# Patient Record
Sex: FEMALE | Race: WHITE | HISPANIC OR LATINO
[De-identification: names, ages, dates, MRNs, and addresses within clinical notes are randomized per-mention and may not be internally consistent; named-entity substitution may affect disease eponyms.]

---
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
## 2019-01-02 ENCOUNTER — EMERGENCY (EMERGENCY)
Facility: HOSPITAL | Age: 19
LOS: 1 days | Discharge: HOME | End: 2019-01-02
Attending: STUDENT IN AN ORGANIZED HEALTH CARE EDUCATION/TRAINING PROGRAM
Payer: MEDICAID

## 2019-01-02 VITALS
TEMPERATURE: 98 F | HEART RATE: 68 BPM | RESPIRATION RATE: 18 BRPM | WEIGHT: 158.95 LBS | DIASTOLIC BLOOD PRESSURE: 84 MMHG | OXYGEN SATURATION: 98 % | HEIGHT: 60 IN | SYSTOLIC BLOOD PRESSURE: 125 MMHG

## 2019-01-02 LAB
APPEARANCE UR: CLEAR — SIGNIFICANT CHANGE UP
BILIRUB UR-MCNC: NEGATIVE — SIGNIFICANT CHANGE UP
COLOR SPEC: YELLOW — SIGNIFICANT CHANGE UP
DIFF PNL FLD: NEGATIVE — SIGNIFICANT CHANGE UP
EPI CELLS # UR: SIGNIFICANT CHANGE UP
GLUCOSE UR QL: 1000 MG/DL
KETONES UR-MCNC: ABNORMAL
LEUKOCYTE ESTERASE UR-ACNC: ABNORMAL
NITRITE UR-MCNC: NEGATIVE — SIGNIFICANT CHANGE UP
PH UR: 6 — SIGNIFICANT CHANGE UP (ref 5–8)
PROT UR-MCNC: 30 MG/DL
RBC CASTS # UR COMP ASSIST: ABNORMAL /HPF (ref 0–4)
SP GR SPEC: 1.02 — SIGNIFICANT CHANGE UP (ref 1.01–1.02)
UROBILINOGEN FLD QL: NEGATIVE MG/DL — SIGNIFICANT CHANGE UP
WBC UR QL: SIGNIFICANT CHANGE UP

## 2019-01-02 PROCEDURE — 81001 URINALYSIS AUTO W/SCOPE: CPT

## 2019-01-02 PROCEDURE — 76817 TRANSVAGINAL US OBSTETRIC: CPT

## 2019-01-02 PROCEDURE — 76817 TRANSVAGINAL US OBSTETRIC: CPT | Mod: 26

## 2019-01-02 PROCEDURE — 87086 URINE CULTURE/COLONY COUNT: CPT

## 2019-01-02 PROCEDURE — 99282 EMERGENCY DEPT VISIT SF MDM: CPT | Mod: 25

## 2019-01-02 PROCEDURE — 76705 ECHO EXAM OF ABDOMEN: CPT | Mod: 26

## 2019-01-02 PROCEDURE — 76882 US LMTD JT/FCL EVL NVASC XTR: CPT

## 2019-01-02 PROCEDURE — 76805 OB US >/= 14 WKS SNGL FETUS: CPT | Mod: 26

## 2019-01-02 PROCEDURE — 99282 EMERGENCY DEPT VISIT SF MDM: CPT

## 2019-01-02 PROCEDURE — 76805 OB US >/= 14 WKS SNGL FETUS: CPT

## 2019-01-02 RX ORDER — ACETAMINOPHEN 500 MG
650 TABLET ORAL ONCE
Qty: 0 | Refills: 0 | Status: COMPLETED | OUTPATIENT
Start: 2019-01-02 | End: 2019-01-02

## 2019-01-02 RX ADMIN — Medication 650 MILLIGRAM(S): at 04:06

## 2019-01-02 NOTE — ED PROVIDER NOTE - MUSCULOSKELETAL, MLM
Spine appears normal, range of motion is not limited, no muscle or joint tenderness + diffuse area of swelling on low back - no erythema or fluctuance

## 2019-01-02 NOTE — ED PROVIDER NOTE - OBJECTIVE STATEMENT
19 y/o F c/o middle low back pain x 1 day.  Patient is 18 weeks pregnant - also c/o vaginal spotting.  She states that she's used 1 pad today.  Patient sees Dr. Martins.  Patient is tolerating PO.  Denies nausea or vomiting at this time.  Denies fever or dysuria.  Patient also c/o area of swelling on low back.  Denies IV drug use.

## 2019-01-02 NOTE — ED PROVIDER NOTE - ATTENDING CONTRIBUTION TO CARE
17 yo female with acute lower back pain with localized tenderness to L5/S1 region. I personally saw the patient with the PA, and completed the key components of the history and physical exam. I then discussed the management plan with the PA.

## 2019-01-03 LAB
CULTURE RESULTS: SIGNIFICANT CHANGE UP
SPECIMEN SOURCE: SIGNIFICANT CHANGE UP

## 2019-02-03 ENCOUNTER — EMERGENCY (EMERGENCY)
Facility: HOSPITAL | Age: 19
LOS: 1 days | Discharge: HOME | End: 2019-02-03
Attending: EMERGENCY MEDICINE
Payer: MEDICAID

## 2019-02-03 VITALS
SYSTOLIC BLOOD PRESSURE: 139 MMHG | RESPIRATION RATE: 18 BRPM | DIASTOLIC BLOOD PRESSURE: 90 MMHG | TEMPERATURE: 98 F | WEIGHT: 108.03 LBS | HEIGHT: 60 IN | HEART RATE: 98 BPM | OXYGEN SATURATION: 97 %

## 2019-02-03 LAB
APPEARANCE UR: ABNORMAL
BACTERIA # UR AUTO: ABNORMAL
BILIRUB UR-MCNC: NEGATIVE — SIGNIFICANT CHANGE UP
COLOR SPEC: SIGNIFICANT CHANGE UP
DIFF PNL FLD: ABNORMAL
EPI CELLS # UR: SIGNIFICANT CHANGE UP
GLUCOSE UR QL: 1000 MG/DL
KETONES UR-MCNC: ABNORMAL
LEUKOCYTE ESTERASE UR-ACNC: ABNORMAL
NITRITE UR-MCNC: NEGATIVE — SIGNIFICANT CHANGE UP
PH UR: 6.5 — SIGNIFICANT CHANGE UP (ref 5–8)
PROT UR-MCNC: NEGATIVE MG/DL — SIGNIFICANT CHANGE UP
RBC CASTS # UR COMP ASSIST: ABNORMAL /HPF (ref 0–4)
SP GR SPEC: 1 — LOW (ref 1.01–1.02)
UROBILINOGEN FLD QL: NEGATIVE MG/DL — SIGNIFICANT CHANGE UP
WBC UR QL: ABNORMAL

## 2019-02-03 PROCEDURE — 99284 EMERGENCY DEPT VISIT MOD MDM: CPT | Mod: 25

## 2019-02-03 RX ORDER — ACETAMINOPHEN 500 MG
650 TABLET ORAL ONCE
Qty: 0 | Refills: 0 | Status: COMPLETED | OUTPATIENT
Start: 2019-02-03 | End: 2019-02-03

## 2019-02-03 RX ADMIN — Medication 650 MILLIGRAM(S): at 22:58

## 2019-02-03 RX ADMIN — Medication 650 MILLIGRAM(S): at 23:00

## 2019-02-03 NOTE — ED PROVIDER NOTE - PROGRESS NOTE DETAILS
reviewing previous charts I see pt has h.o diabtes original dextrose ordered secondary to dehydration and when origianly I questioned pt about her medical hisotry she stated she had none so I was unaware of her diabetic history until she was questioned again. will give normal saline insulin and reassess

## 2019-02-03 NOTE — ED PROVIDER NOTE - OBJECTIVE STATEMENT
17 y/o F pt with hx of asthma, DM, 19 weeks pregnant presents to ED c/o acute onset of RLQ abd pian radiating to her right flank x5 hours, described as constant and worsening over the last 2 hours, no recent trauma reported. Pt states she has mild vaginal spotting today denies heavy flow. Pt has not had sono of pregnancy recently. No pain meds today. denies fever. denies HA or neck pain. no chest pain or sob. no n/v/d. no urinary f/u/d. no motor or sensory deficits. denies illicit drug use. no recent travel. no rash. no other acute issues symptoms or concerns

## 2019-02-03 NOTE — ED PROVIDER NOTE - MEDICAL DECISION MAKING DETAILS
uti in pregnancy with no overt signs pyelo. she took her lantus tonight she is tolerating po fluids long talk with pt about low threshold for return such as fever increased pain or unable tolerate po fluids she understands she needs follow up tomm by ob gyn without fail . she looks better clincally and I stressed multiple time imprtance close f.u take abx and to return if any issues with her h/o of prior dka and diabtetes she has her insulin at home

## 2019-02-04 VITALS
HEART RATE: 89 BPM | OXYGEN SATURATION: 99 % | SYSTOLIC BLOOD PRESSURE: 122 MMHG | RESPIRATION RATE: 20 BRPM | TEMPERATURE: 99 F | DIASTOLIC BLOOD PRESSURE: 70 MMHG

## 2019-02-04 LAB
ALBUMIN SERPL ELPH-MCNC: 3.7 G/DL — SIGNIFICANT CHANGE UP (ref 3.3–5.2)
ALP SERPL-CCNC: 88 U/L — SIGNIFICANT CHANGE UP (ref 40–120)
ALT FLD-CCNC: 12 U/L — SIGNIFICANT CHANGE UP
ANION GAP SERPL CALC-SCNC: 17 MMOL/L — SIGNIFICANT CHANGE UP (ref 5–17)
AST SERPL-CCNC: 22 U/L — SIGNIFICANT CHANGE UP
BASOPHILS # BLD AUTO: 0 K/UL — SIGNIFICANT CHANGE UP (ref 0–0.2)
BASOPHILS NFR BLD AUTO: 0.1 % — SIGNIFICANT CHANGE UP (ref 0–2)
BILIRUB SERPL-MCNC: 0.5 MG/DL — SIGNIFICANT CHANGE UP (ref 0.4–2)
BUN SERPL-MCNC: 13 MG/DL — SIGNIFICANT CHANGE UP (ref 8–20)
CALCIUM SERPL-MCNC: 9.6 MG/DL — SIGNIFICANT CHANGE UP (ref 8.6–10.2)
CHLORIDE SERPL-SCNC: 96 MMOL/L — LOW (ref 98–107)
CO2 SERPL-SCNC: 19 MMOL/L — LOW (ref 22–29)
CREAT SERPL-MCNC: 0.59 MG/DL — SIGNIFICANT CHANGE UP (ref 0.5–1.3)
EOSINOPHIL # BLD AUTO: 0 K/UL — SIGNIFICANT CHANGE UP (ref 0–0.5)
EOSINOPHIL NFR BLD AUTO: 0.3 % — SIGNIFICANT CHANGE UP (ref 0–6)
GLUCOSE SERPL-MCNC: 469 MG/DL — HIGH (ref 70–115)
HCT VFR BLD CALC: 37 % — SIGNIFICANT CHANGE UP (ref 37–47)
HGB BLD-MCNC: 13 G/DL — SIGNIFICANT CHANGE UP (ref 12–16)
LYMPHOCYTES # BLD AUTO: 1.2 K/UL — SIGNIFICANT CHANGE UP (ref 1–4.8)
LYMPHOCYTES # BLD AUTO: 11.1 % — LOW (ref 20–55)
MAGNESIUM SERPL-MCNC: 1.9 MG/DL — SIGNIFICANT CHANGE UP (ref 1.6–2.6)
MCHC RBC-ENTMCNC: 32.3 PG — HIGH (ref 27–31)
MCHC RBC-ENTMCNC: 35.1 G/DL — SIGNIFICANT CHANGE UP (ref 32–36)
MCV RBC AUTO: 91.8 FL — SIGNIFICANT CHANGE UP (ref 81–99)
MONOCYTES # BLD AUTO: 0.9 K/UL — HIGH (ref 0–0.8)
MONOCYTES NFR BLD AUTO: 7.9 % — SIGNIFICANT CHANGE UP (ref 3–10)
NEUTROPHILS # BLD AUTO: 8.7 K/UL — HIGH (ref 1.8–8)
NEUTROPHILS NFR BLD AUTO: 80.2 % — HIGH (ref 37–73)
PLATELET # BLD AUTO: 308 K/UL — SIGNIFICANT CHANGE UP (ref 150–400)
POTASSIUM SERPL-MCNC: 4.5 MMOL/L — SIGNIFICANT CHANGE UP (ref 3.5–5.3)
POTASSIUM SERPL-SCNC: 4.5 MMOL/L — SIGNIFICANT CHANGE UP (ref 3.5–5.3)
PROT SERPL-MCNC: 7.6 G/DL — SIGNIFICANT CHANGE UP (ref 6.6–8.7)
RBC # BLD: 4.03 M/UL — LOW (ref 4.4–5.2)
RBC # FLD: 11.9 % — SIGNIFICANT CHANGE UP (ref 11–15.6)
SODIUM SERPL-SCNC: 132 MMOL/L — LOW (ref 135–145)
WBC # BLD: 10.9 K/UL — HIGH (ref 4.8–10.8)
WBC # FLD AUTO: 10.9 K/UL — HIGH (ref 4.8–10.8)

## 2019-02-04 PROCEDURE — 87086 URINE CULTURE/COLONY COUNT: CPT

## 2019-02-04 PROCEDURE — 96372 THER/PROPH/DIAG INJ SC/IM: CPT

## 2019-02-04 PROCEDURE — 83735 ASSAY OF MAGNESIUM: CPT

## 2019-02-04 PROCEDURE — 99284 EMERGENCY DEPT VISIT MOD MDM: CPT | Mod: 25

## 2019-02-04 PROCEDURE — 82962 GLUCOSE BLOOD TEST: CPT

## 2019-02-04 PROCEDURE — 80053 COMPREHEN METABOLIC PANEL: CPT

## 2019-02-04 PROCEDURE — 87186 SC STD MICRODIL/AGAR DIL: CPT

## 2019-02-04 PROCEDURE — 36415 COLL VENOUS BLD VENIPUNCTURE: CPT

## 2019-02-04 PROCEDURE — 85027 COMPLETE CBC AUTOMATED: CPT

## 2019-02-04 PROCEDURE — 76775 US EXAM ABDO BACK WALL LIM: CPT

## 2019-02-04 PROCEDURE — 81001 URINALYSIS AUTO W/SCOPE: CPT

## 2019-02-04 PROCEDURE — 76775 US EXAM ABDO BACK WALL LIM: CPT | Mod: 26

## 2019-02-04 RX ORDER — SODIUM CHLORIDE 9 MG/ML
1000 INJECTION, SOLUTION INTRAVENOUS
Qty: 0 | Refills: 0 | Status: DISCONTINUED | OUTPATIENT
Start: 2019-02-04 | End: 2019-02-04

## 2019-02-04 RX ORDER — CEPHALEXIN 500 MG
500 CAPSULE ORAL ONCE
Qty: 0 | Refills: 0 | Status: COMPLETED | OUTPATIENT
Start: 2019-02-04 | End: 2019-02-04

## 2019-02-04 RX ORDER — SODIUM CHLORIDE 9 MG/ML
1000 INJECTION, SOLUTION INTRAVENOUS
Qty: 0 | Refills: 0 | Status: COMPLETED | OUTPATIENT
Start: 2019-02-04 | End: 2019-02-04

## 2019-02-04 RX ORDER — SODIUM CHLORIDE 9 MG/ML
1000 INJECTION INTRAMUSCULAR; INTRAVENOUS; SUBCUTANEOUS ONCE
Qty: 0 | Refills: 0 | Status: COMPLETED | OUTPATIENT
Start: 2019-02-04 | End: 2019-02-04

## 2019-02-04 RX ORDER — CEPHALEXIN 500 MG
1 CAPSULE ORAL
Qty: 28 | Refills: 0 | OUTPATIENT
Start: 2019-02-04 | End: 2019-02-10

## 2019-02-04 RX ORDER — INSULIN HUMAN 100 [IU]/ML
8 INJECTION, SOLUTION SUBCUTANEOUS ONCE
Qty: 0 | Refills: 0 | Status: COMPLETED | OUTPATIENT
Start: 2019-02-04 | End: 2019-02-04

## 2019-02-04 RX ADMIN — INSULIN HUMAN 8 UNIT(S): 100 INJECTION, SOLUTION SUBCUTANEOUS at 02:29

## 2019-02-04 RX ADMIN — SODIUM CHLORIDE 1000 MILLILITER(S): 9 INJECTION, SOLUTION INTRAVENOUS at 01:29

## 2019-02-04 RX ADMIN — SODIUM CHLORIDE 2000 MILLILITER(S): 9 INJECTION INTRAMUSCULAR; INTRAVENOUS; SUBCUTANEOUS at 02:29

## 2019-02-04 RX ADMIN — Medication 500 MILLIGRAM(S): at 04:41

## 2019-02-04 NOTE — ED ADULT NURSE REASSESSMENT NOTE - NS ED NURSE REASSESS COMMENT FT1
pt told Rn and MD that she has no medical history. MD ordered fluids with d5NS due to no medical history. when asked again if patient had medical history due to elevated glucose level pt states she has diabetes. fluids chanegd to NS

## 2019-03-07 NOTE — PATIENT PROFILE ADULT. - FUNCTIONAL LEVEL PRIOR: DRESSING
NURSING ADMISSION NOTE      Patient admitted via PACU  Oriented to room. Safety precautions initiated. Bed in low position. Call light in reach. (0) independent

## 2019-03-17 ENCOUNTER — INPATIENT (INPATIENT)
Facility: HOSPITAL | Age: 19
LOS: 2 days | Discharge: STILL ADMITTED OR ADMINISTRATIVE | DRG: 831 | End: 2019-03-20
Attending: OBSTETRICS & GYNECOLOGY | Admitting: INTERNAL MEDICINE
Payer: MEDICAID

## 2019-03-17 VITALS
SYSTOLIC BLOOD PRESSURE: 150 MMHG | RESPIRATION RATE: 35 BRPM | WEIGHT: 100.09 LBS | DIASTOLIC BLOOD PRESSURE: 94 MMHG | OXYGEN SATURATION: 98 % | HEART RATE: 118 BPM | HEIGHT: 63 IN

## 2019-03-17 DIAGNOSIS — E13.10 OTHER SPECIFIED DIABETES MELLITUS WITH KETOACIDOSIS WITHOUT COMA: ICD-10-CM

## 2019-03-17 DIAGNOSIS — R69 ILLNESS, UNSPECIFIED: ICD-10-CM

## 2019-03-17 DIAGNOSIS — E10.10 TYPE 1 DIABETES MELLITUS WITH KETOACIDOSIS WITHOUT COMA: ICD-10-CM

## 2019-03-17 LAB
ALBUMIN SERPL ELPH-MCNC: 4.4 G/DL — SIGNIFICANT CHANGE UP (ref 3.3–5.2)
ALP SERPL-CCNC: 131 U/L — HIGH (ref 40–120)
ALT FLD-CCNC: 16 U/L — SIGNIFICANT CHANGE UP
ANION GAP SERPL CALC-SCNC: 16 MMOL/L — SIGNIFICANT CHANGE UP (ref 5–17)
ANION GAP SERPL CALC-SCNC: 18 MMOL/L — HIGH (ref 5–17)
ANION GAP SERPL CALC-SCNC: >28 MMOL/L — HIGH (ref 5–17)
APPEARANCE UR: CLEAR — SIGNIFICANT CHANGE UP
AST SERPL-CCNC: 17 U/L — SIGNIFICANT CHANGE UP
BACTERIA # UR AUTO: ABNORMAL
BASOPHILS # BLD AUTO: 0 K/UL — SIGNIFICANT CHANGE UP (ref 0–0.2)
BASOPHILS NFR BLD AUTO: 1 % — SIGNIFICANT CHANGE UP (ref 0–2)
BILIRUB SERPL-MCNC: <0.2 MG/DL — LOW (ref 0.4–2)
BILIRUB UR-MCNC: NEGATIVE — SIGNIFICANT CHANGE UP
BUN SERPL-MCNC: 15 MG/DL — SIGNIFICANT CHANGE UP (ref 8–20)
BUN SERPL-MCNC: 17 MG/DL — SIGNIFICANT CHANGE UP (ref 8–20)
BUN SERPL-MCNC: 25 MG/DL — HIGH (ref 8–20)
CALCIUM SERPL-MCNC: 10 MG/DL — SIGNIFICANT CHANGE UP (ref 8.6–10.2)
CALCIUM SERPL-MCNC: 8.3 MG/DL — LOW (ref 8.6–10.2)
CALCIUM SERPL-MCNC: 8.3 MG/DL — LOW (ref 8.6–10.2)
CHLORIDE SERPL-SCNC: 111 MMOL/L — HIGH (ref 98–107)
CHLORIDE SERPL-SCNC: 111 MMOL/L — HIGH (ref 98–107)
CHLORIDE SERPL-SCNC: 92 MMOL/L — LOW (ref 98–107)
CO2 SERPL-SCNC: 10 MMOL/L — CRITICAL LOW (ref 22–29)
CO2 SERPL-SCNC: 8 MMOL/L — CRITICAL LOW (ref 22–29)
CO2 SERPL-SCNC: <6 MMOL/L — CRITICAL LOW (ref 22–29)
COLOR SPEC: YELLOW — SIGNIFICANT CHANGE UP
CREAT SERPL-MCNC: 0.65 MG/DL — SIGNIFICANT CHANGE UP (ref 0.5–1.3)
CREAT SERPL-MCNC: 0.75 MG/DL — SIGNIFICANT CHANGE UP (ref 0.5–1.3)
CREAT SERPL-MCNC: 1.13 MG/DL — SIGNIFICANT CHANGE UP (ref 0.5–1.3)
DIFF PNL FLD: ABNORMAL
EOSINOPHIL # BLD AUTO: 0 K/UL — SIGNIFICANT CHANGE UP (ref 0–0.5)
EPI CELLS # UR: SIGNIFICANT CHANGE UP
GAS PNL BLDV: SIGNIFICANT CHANGE UP
GLUCOSE BLDC GLUCOMTR-MCNC: 174 MG/DL — HIGH (ref 70–99)
GLUCOSE BLDC GLUCOMTR-MCNC: 185 MG/DL — HIGH (ref 70–99)
GLUCOSE BLDC GLUCOMTR-MCNC: 191 MG/DL — HIGH (ref 70–99)
GLUCOSE BLDC GLUCOMTR-MCNC: 212 MG/DL — HIGH (ref 70–99)
GLUCOSE SERPL-MCNC: 191 MG/DL — HIGH (ref 70–115)
GLUCOSE SERPL-MCNC: 209 MG/DL — HIGH (ref 70–115)
GLUCOSE SERPL-MCNC: 628 MG/DL — CRITICAL HIGH (ref 70–115)
GLUCOSE UR QL: 1000 MG/DL
HCT VFR BLD CALC: 40.1 % — SIGNIFICANT CHANGE UP (ref 37–47)
HGB BLD-MCNC: 13.7 G/DL — SIGNIFICANT CHANGE UP (ref 12–16)
KETONES UR-MCNC: ABNORMAL
LEUKOCYTE ESTERASE UR-ACNC: NEGATIVE — SIGNIFICANT CHANGE UP
LYMPHOCYTES # BLD AUTO: 1 K/UL — SIGNIFICANT CHANGE UP (ref 1–4.8)
LYMPHOCYTES # BLD AUTO: 7 % — LOW (ref 20–55)
MAGNESIUM SERPL-MCNC: 1.7 MG/DL — SIGNIFICANT CHANGE UP (ref 1.6–2.6)
MCHC RBC-ENTMCNC: 31.4 PG — HIGH (ref 27–31)
MCHC RBC-ENTMCNC: 34.2 G/DL — SIGNIFICANT CHANGE UP (ref 32–36)
MCV RBC AUTO: 92 FL — SIGNIFICANT CHANGE UP (ref 81–99)
MONOCYTES # BLD AUTO: 0.4 K/UL — SIGNIFICANT CHANGE UP (ref 0–0.8)
MONOCYTES NFR BLD AUTO: 3 % — SIGNIFICANT CHANGE UP (ref 3–10)
NEUTROPHILS # BLD AUTO: 13.4 K/UL — HIGH (ref 1.8–8)
NEUTROPHILS NFR BLD AUTO: 84 % — HIGH (ref 37–73)
NITRITE UR-MCNC: NEGATIVE — SIGNIFICANT CHANGE UP
PH UR: 5 — SIGNIFICANT CHANGE UP (ref 5–8)
PHOSPHATE SERPL-MCNC: 1.6 MG/DL — LOW (ref 2.4–4.7)
PLATELET # BLD AUTO: 642 K/UL — HIGH (ref 150–400)
POTASSIUM SERPL-MCNC: 3.5 MMOL/L — SIGNIFICANT CHANGE UP (ref 3.5–5.3)
POTASSIUM SERPL-MCNC: 3.6 MMOL/L — SIGNIFICANT CHANGE UP (ref 3.5–5.3)
POTASSIUM SERPL-MCNC: 6 MMOL/L — HIGH (ref 3.5–5.3)
POTASSIUM SERPL-SCNC: 3.5 MMOL/L — SIGNIFICANT CHANGE UP (ref 3.5–5.3)
POTASSIUM SERPL-SCNC: 3.6 MMOL/L — SIGNIFICANT CHANGE UP (ref 3.5–5.3)
POTASSIUM SERPL-SCNC: 6 MMOL/L — HIGH (ref 3.5–5.3)
PROT SERPL-MCNC: 9.7 G/DL — HIGH (ref 6.6–8.7)
PROT UR-MCNC: 100 MG/DL
RBC # BLD: 4.36 M/UL — LOW (ref 4.4–5.2)
RBC # FLD: 13.3 % — SIGNIFICANT CHANGE UP (ref 11–15.6)
RBC CASTS # UR COMP ASSIST: SIGNIFICANT CHANGE UP /HPF (ref 0–4)
SODIUM SERPL-SCNC: 126 MMOL/L — LOW (ref 135–145)
SODIUM SERPL-SCNC: 137 MMOL/L — SIGNIFICANT CHANGE UP (ref 135–145)
SODIUM SERPL-SCNC: 137 MMOL/L — SIGNIFICANT CHANGE UP (ref 135–145)
SP GR SPEC: 1.02 — SIGNIFICANT CHANGE UP (ref 1.01–1.02)
UROBILINOGEN FLD QL: NEGATIVE MG/DL — SIGNIFICANT CHANGE UP
WBC # BLD: 14.9 K/UL — HIGH (ref 4.8–10.8)
WBC # FLD AUTO: 14.9 K/UL — HIGH (ref 4.8–10.8)
WBC UR QL: SIGNIFICANT CHANGE UP

## 2019-03-17 PROCEDURE — 71046 X-RAY EXAM CHEST 2 VIEWS: CPT | Mod: 26

## 2019-03-17 PROCEDURE — 99291 CRITICAL CARE FIRST HOUR: CPT

## 2019-03-17 RX ORDER — POTASSIUM PHOSPHATE, MONOBASIC POTASSIUM PHOSPHATE, DIBASIC 236; 224 MG/ML; MG/ML
15 INJECTION, SOLUTION INTRAVENOUS ONCE
Qty: 0 | Refills: 0 | Status: COMPLETED | OUTPATIENT
Start: 2019-03-17 | End: 2019-03-18

## 2019-03-17 RX ORDER — SODIUM CHLORIDE 9 MG/ML
1000 INJECTION, SOLUTION INTRAVENOUS ONCE
Qty: 0 | Refills: 0 | Status: COMPLETED | OUTPATIENT
Start: 2019-03-17 | End: 2019-03-17

## 2019-03-17 RX ORDER — ONDANSETRON 8 MG/1
4 TABLET, FILM COATED ORAL ONCE
Qty: 0 | Refills: 0 | Status: DISCONTINUED | OUTPATIENT
Start: 2019-03-17 | End: 2019-03-20

## 2019-03-17 RX ORDER — INSULIN HUMAN 100 [IU]/ML
5 INJECTION, SOLUTION SUBCUTANEOUS ONCE
Qty: 0 | Refills: 0 | Status: COMPLETED | OUTPATIENT
Start: 2019-03-17 | End: 2019-03-17

## 2019-03-17 RX ORDER — INSULIN ASPART 100 [IU]/ML
6 INJECTION, SOLUTION SUBCUTANEOUS
Qty: 0 | Refills: 0 | COMMUNITY

## 2019-03-17 RX ORDER — ONDANSETRON 8 MG/1
4 TABLET, FILM COATED ORAL ONCE
Qty: 0 | Refills: 0 | Status: COMPLETED | OUTPATIENT
Start: 2019-03-17 | End: 2019-03-17

## 2019-03-17 RX ORDER — ENOXAPARIN SODIUM 100 MG/ML
40 INJECTION SUBCUTANEOUS DAILY
Qty: 0 | Refills: 0 | Status: DISCONTINUED | OUTPATIENT
Start: 2019-03-17 | End: 2019-03-20

## 2019-03-17 RX ORDER — SODIUM CHLORIDE 9 MG/ML
1000 INJECTION, SOLUTION INTRAVENOUS
Qty: 0 | Refills: 0 | Status: DISCONTINUED | OUTPATIENT
Start: 2019-03-17 | End: 2019-03-18

## 2019-03-17 RX ORDER — SODIUM CHLORIDE 9 MG/ML
2000 INJECTION, SOLUTION INTRAVENOUS ONCE
Qty: 0 | Refills: 0 | Status: COMPLETED | OUTPATIENT
Start: 2019-03-17 | End: 2019-03-17

## 2019-03-17 RX ORDER — INSULIN HUMAN 100 [IU]/ML
2 INJECTION, SOLUTION SUBCUTANEOUS
Qty: 100 | Refills: 0 | Status: DISCONTINUED | OUTPATIENT
Start: 2019-03-17 | End: 2019-03-18

## 2019-03-17 RX ORDER — SODIUM CHLORIDE 9 MG/ML
1000 INJECTION, SOLUTION INTRAVENOUS
Qty: 0 | Refills: 0 | Status: DISCONTINUED | OUTPATIENT
Start: 2019-03-17 | End: 2019-03-17

## 2019-03-17 RX ORDER — INSULIN LISPRO 100/ML
4 VIAL (ML) SUBCUTANEOUS ONCE
Qty: 0 | Refills: 0 | Status: DISCONTINUED | OUTPATIENT
Start: 2019-03-17 | End: 2019-03-17

## 2019-03-17 RX ADMIN — INSULIN HUMAN 5 UNIT(S)/HR: 100 INJECTION, SOLUTION SUBCUTANEOUS at 17:41

## 2019-03-17 RX ADMIN — ONDANSETRON 4 MILLIGRAM(S): 8 TABLET, FILM COATED ORAL at 17:40

## 2019-03-17 RX ADMIN — SODIUM CHLORIDE 200 MILLILITER(S): 9 INJECTION, SOLUTION INTRAVENOUS at 19:41

## 2019-03-17 RX ADMIN — SODIUM CHLORIDE 2000 MILLILITER(S): 9 INJECTION, SOLUTION INTRAVENOUS at 19:23

## 2019-03-17 RX ADMIN — SODIUM CHLORIDE 1000 MILLILITER(S): 9 INJECTION, SOLUTION INTRAVENOUS at 19:23

## 2019-03-17 RX ADMIN — SODIUM CHLORIDE 200 MILLILITER(S): 9 INJECTION, SOLUTION INTRAVENOUS at 20:50

## 2019-03-17 RX ADMIN — SODIUM CHLORIDE 1000 MILLILITER(S): 9 INJECTION, SOLUTION INTRAVENOUS at 17:29

## 2019-03-17 RX ADMIN — INSULIN HUMAN 5 UNIT(S): 100 INJECTION, SOLUTION SUBCUTANEOUS at 17:32

## 2019-03-17 RX ADMIN — SODIUM CHLORIDE 2000 MILLILITER(S): 9 INJECTION, SOLUTION INTRAVENOUS at 17:29

## 2019-03-17 NOTE — H&P ADULT - NSICDXPROBLEM_GEN_ALL_CORE_FT
PROBLEM DIAGNOSES  Problem: Type 1 diabetes mellitus with ketoacidosis  Assessment and Plan: as above    Problem: Acute illness during third pregnancy  Assessment and Plan:       R/O PROBLEM DIAGNOSES  Problem: Acute UTI  Assessment and Plan: as above

## 2019-03-17 NOTE — ED ADULT NURSE NOTE - OBJECTIVE STATEMENT
Pt presented to ED with c/o labored breathing, hyperglycemia, and N/V. Pt states she is 27 weeks pregnant. Pt has hx of DM type 1. Pt states she began feeling worse earlier today.

## 2019-03-17 NOTE — H&P ADULT - ASSESSMENT
19 y/o F, PMHx of DM1, DKA, Asthma,  26 weeks GA p/w SOB BIBEMS w/ FS in 450s. Vomited once in ED. Endorses Right flank pain. Recent h/o influenza and Cystitis 2 weeks ago. FS 500s, AG 28. Started on insulin gtt U/hr, given 5 U IV push Humalin, 3L LR bolus.     # DKA  Titrate Insulin ggt  POCT Glucose checks q1h  Doxalamine, Pyridoxine prn for N/V  NPO  Blood, sputum, urine Cx    # Pregnancy, 27 weeks GA  MFM consulted for fetal wellbeing      # ?UTI  UA neg   UCx pending      35 minutes assessing presenting problems of acute illness, which pose high probability of life threatening deterioration or end organ damage/dysfunction, as well as medical decision making including initiating plan of care, reviewing data, reviewing radiologic exams, discussing with multidisciplinary team,  discussing goals of care with patient/family, and writing this note.  Non-inclusive of procedures performed, 19 y/o F, PMHx of DM1, DKA, Asthma,  26 weeks GA p/w SOB BIBEMS w/ FS in 450s. Vomited once in ED. Endorses Right flank pain. Recent h/o influenza and Cystitis 2 weeks ago. FS 500s, AG 28. Started on insulin gtt U/hr, given 5 U IV push Humalin, 3L LR bolus.   Will admit to MICU for DKA and the need for insulin gtt and aggressive fluid resuscitation. Will get OB/GYN on the case.    Plan discussed with MICU attending Dr. Timmons    Critical care time: 48 minutes assessing presenting problems of acute illness, which pose high probability of life threatening deterioration or end organ damage/dysfunction, as well as medical decision making including initiating plan of care, reviewing data, reviewing radiologic exams, discussing with multidisciplinary team,  discussing goals of care with patient/family, and writing this note.  Non-inclusive of procedures performed,        Problem list:  1) DKA  2) Respiratory distress  3) pregnancy  4) SIRS    # DKA  Start insulin infusion and q1 hour accuchecks  Aggressive fluid resuscitation, s/p 3L in ER, start fluids at 200cc/hr   BMP q4 hours  Replace potassium, mag, phos as needed   NPO  Blood, sputum, urine Cx  Will need endocrine and diabetes specialist consult     # Pregnancy, 27 weeks GA  OB/GYN  consulted for fetal wellbeing    # SIRS  Need to r/o infection given SIRS and recent cystitis  UA negative, f/u culture  obtain blood cultures, CXR

## 2019-03-17 NOTE — ED PROVIDER NOTE - PHYSICAL EXAMINATION
Julianna Martínez DO.:   GENERAL: Patient awake alert, tachypenic.  HEENT: NC/AT, Moist mucous membranes, PERRL, EOMI.  LUNGS: CTAB, no wheezes or crackles. Tachypneic.  CARDIAC: RRR, no m/r/g.    ABDOMEN: Gravid, Soft, NT, ND, No rebound, guarding. No CVA tenderness.   EXT: No edema. No calf tenderness.  MSK: No spinal tenderness, no pain with movement, no deformities.  NEURO: A&Ox3. Moving all extremities.  SKIN: Warm and dry. No rash.  PSYCH: Normal affect.

## 2019-03-17 NOTE — PHARMACOTHERAPY INTERVENTION NOTE - COMMENTS
Medication reconciliation completed with patient at bedside. She is on NPH 20 units in the morning and 8 units in the evening, as well as Humalog before meals based on carb counting.     She was previously on nitrofurantoin, cefdinir and azithromycin for "kidney infection".

## 2019-03-17 NOTE — ED PROVIDER NOTE - OBJECTIVE STATEMENT
18F pmhx of DM1 (with DKA),  26 weeks pregnant presents with SOB and hyperglycemia, by EMS FS was 450s, here in ER "high." Patient states that had taken her home insulin (doesn't remember how much) prior to calling EMS. No issues with pregnancy 18F pmhx of DM1 (with DKA),  26 weeks pregnant presents with SOB and hyperglycemia, by EMS FS was 450s, here in ER "high." Patient states that had taken her home insulin (doesn't remember how much) prior to calling EMS. No issues with pregnancy. No other complaints. Has had DKA in the past. No cough, recent illness, fevers, chills, N/V/D.

## 2019-03-17 NOTE — CONSULT NOTE ADULT - NSICDXPROBLEM_GEN_ALL_CORE_FT
PROBLEM DIAGNOSES  Problem: Acute illness during third pregnancy  Recommendation: As per primary team    Problem: Type 1 diabetes mellitus with ketoacidosis  Recommendation: As per primary team    Problem: DKA (diabetic ketoacidoses)  Recommendation: As per primary team

## 2019-03-17 NOTE — ED PROVIDER NOTE - NS ED ROS FT
CONST: no fevers, no chills  EYES: no pain, no vision changes  ENT: no sore throat, no ear pain, no change in hearing  CV: no chest pain, no leg swelling  RESP: +shortness of breath, no cough  ABD: no abdominal pain, no nausea, no vomiting, no diarrhea  : no dysuria, no flank pain, no hematuria  MSK: no back pain, no extremity pain  NEURO: no headache or additional neurologic complaints  HEME: no easy bleeding  SKIN:  no rash

## 2019-03-17 NOTE — ED PROVIDER NOTE - ATTENDING CONTRIBUTION TO CARE
I, Jayson Arango, personally saw the patient with the resident, and completed the key components of the history and physical exam. I then discussed the management plan with the resident.    17 yo F hx of DM, multiple DKA episodes, , 27 weeks pregnant, p/w hyperglycemia and sob. On exam, patient looked uncomfortable, RRR, kusmal breathing, gravid abdomen that is non-tender, no leg swelling. She was found to be in DKA with glucose of 628, bicarb <6, anion gap >28, large ketone in urine. Patient received multiple boluses of IVF, insulin bolus and gtt, admitted to MICU for DKA.

## 2019-03-17 NOTE — ED PROVIDER NOTE - CLINICAL SUMMARY MEDICAL DECISION MAKING FREE TEXT BOX
18F 26 weeks pregnant presents with hyperglycemia. Concern for DKA vs. HHNC. Will get labs, blood gas. Start insulin. ICU consult, admit.

## 2019-03-17 NOTE — H&P ADULT - NSHPLABSRESULTS_GEN_ALL_CORE
Lab Results:  CBC  CBC Full  -  ( 17 Mar 2019 16:05 )  WBC Count : 14.9 K/uL  Hemoglobin : 13.7 g/dL  Hematocrit : 40.1 %  Platelet Count - Automated : 642 K/uL  Mean Cell Volume : 92.0 fl  Mean Cell Hemoglobin : 31.4 pg  Mean Cell Hemoglobin Concentration : 34.2 g/dL  Auto Neutrophil # : 13.4 K/uL  Auto Lymphocyte # : 1.0 K/uL  Auto Monocyte # : 0.4 K/uL  Auto Eosinophil # : 0.0 K/uL  Auto Basophil # : 0.0 K/uL  Auto Neutrophil % : 84.0 %  Auto Lymphocyte % : 7.0 %  Auto Monocyte % : 3.0 %  Auto Eosinophil % : x  Auto Basophil % : 1.0 %    .		Differential:	[] Automated		[] Manual  Chemistry      126<L>  |  92<L>  |  25.0<H>  ----------------------------<  628<HH>  6.0<H>   |  <6.0<LL>  |  1.13    Ca    10.0      17 Mar 2019 16:05    TPro  9.7<H>  /  Alb  4.4  /  TBili  <0.2<L>  /  DBili  x   /  AST  17  /  ALT  16  /  AlkPhos  131<H>  03-17    LIVER FUNCTIONS - ( 17 Mar 2019 16:05 )  Alb: 4.4 g/dL / Pro: 9.7 g/dL / ALK PHOS: 131 U/L / ALT: 16 U/L / AST: 17 U/L / GGT: x             Urinalysis Basic - ( 17 Mar 2019 17:14 )    Color: Yellow / Appearance: Clear / S.020 / pH: x  Gluc: x / Ketone: Large  / Bili: Negative / Urobili: Negative mg/dL   Blood: x / Protein: 100 mg/dL / Nitrite: Negative   Leuk Esterase: Negative / RBC: 0-2 /HPF / WBC 0-2   Sq Epi: x / Non Sq Epi: Occasional / Bacteria: Occasional        MICROBIOLOGY/CULTURES:    RADIOLOGY RESULTS:    Toxicities (with grade)  1.  2.  3.  4.

## 2019-03-17 NOTE — CONSULT NOTE ADULT - SUBJECTIVE AND OBJECTIVE BOX
Name: ARIE HAYDEN  MRN: [id number]  Allergies: apple (Unknown)  Pears (Unknown)      ARIE HAYDEN 18y  at approximately 27w2d by ADWOA 18 with known T1DM admitted for management of DKA with FS in 450s. Pt states that her insulin regimen was recently changed to Humulin 20U AM, 8U PM and Humulog PRN before/after meals.   Denies any complications with the pregnancy thus far. Her T1DM is managed by an MFM at [facility] but is unable to recall her name. She has been admitted to [facility] in the past for DKA.  Reports no problems with fetal growth, amniotic fluid, or blood pressures during her pregnancy.  Denies VB, CTX or LOF. +FM.    Her OBGYN is Dr. Glenroy Burris.    PAST OBSTETRICAL HISTORY: SAB x2    PAST MEDICAL HISTORY:  Asthma  Diabetes  Diabetes mellitus type 1    PAST SURGICAL HISTORY:  S/P tonsillectomy and adenoidectomy  S/P tonsillectomy and adenoidectomy    Home Medications:  HumaLOG 100 units/mL injectable solution: Prior to meals (carb counting) (17 Mar 2019 19:07)  insulin isophane (NPH) 100 units/mL human recombinant subcutaneous suspension: 20 units in the morning and 8 units at bedtime (17 Mar 2019 19:07)  Prenatal Multivitamins with Folic Acid 1 mg oral tablet: 1 tab(s) orally once a day (17 Mar 2019 19:07)    HOSPITAL MEDS:  enoxaparin Injectable 40 milliGRAM(s) SubCutaneous daily  insulin Infusion 5 Unit(s)/Hr IV Continuous <Continuous>  multiple electrolytes Injection Type 1 1000 milliLiter(s) IV Continuous <Continuous>    Allergies    apple (Unknown)  No Known Drug Allergies  Pears (Unknown)    Intolerances    Vital Signs Last 24 Hrs  T(C): 36.8 (17 Mar 2019 19:31), Max: 36.8 (17 Mar 2019 19:31)  T(F): 98.2 (17 Mar 2019 19:31), Max: 98.2 (17 Mar 2019 19:31)  HR: 100 (17 Mar 2019 19:31) (100 - 118)  BP: 128/84 (17 Mar 2019 19:31) (128/84 - 150/94)  BP(mean): --  RR: 24 (17 Mar 2019 19:31) (24 - 35)  SpO2: 98% (17 Mar 2019 19:31) (98% - 100%)    PHYSICAL EXAM:  GEN: NAD, AOx3  Abd: Gravid, Soft, Nontender, Nondistended. No rebound tenderness or guarding.     LABS:                        13.7   14.9  )-----------( 642      ( 17 Mar 2019 16:05 )             40.1     03-17    126<L>  |  92<L>  |  25.0<H>  ----------------------------<  628<HH>  6.0<H>   |  <6.0<LL>  |  1.13    Ca    10.0      17 Mar 2019 16:05    TPro  9.7<H>  /  Alb  4.4  /  TBili  <0.2<L>  /  DBili  x   /  AST  17  /  ALT  16  /  AlkPhos  131<H>  03-17    Urinalysis Basic - ( 17 Mar 2019 17:14 )    Color: Yellow / Appearance: Clear / S.020 / pH: x  Gluc: x / Ketone: Large  / Bili: Negative / Urobili: Negative mg/dL   Blood: x / Protein: 100 mg/dL / Nitrite: Negative   Leuk Esterase: Negative / RBC: 0-2 /HPF / WBC 0-2   Sq Epi: x / Non Sq Epi: Occasional / Bacteria: Occasional    RADIOLOGY STUDIES:  Pending

## 2019-03-17 NOTE — H&P ADULT - ATTENDING COMMENTS
Pt admitted by [facility] PA, I have seen and evaluated this patient independently and agree with the above findings: type I DM with DKA in setting of possible pyelo, concurrent pregnancy (26 weeks). Continue insulin gtts, aggressive hydration and replacement of lytes. Involve Maternal Fetal Medicine, Endocrine eval.

## 2019-03-17 NOTE — CONSULT NOTE ADULT - ASSESSMENT
19yo  at 27w2d admitted for DKA management. No OB complaints today.     DKA management as per primary team.    Recommend:  Fetal BioPhysical Profile  Non-Stress Test q24h  Maternal Fetal Medicine Consult in the AM    Will continue to follow    Pt seen and examined with Dr. Rain  Case discussed with Dr. Glenroy Burris

## 2019-03-17 NOTE — H&P ADULT - HISTORY OF PRESENT ILLNESS
17 y/o F, PMHx of DM1, DKA, Asthma,  26 weeks GA p/w SOB BIBEMS w/ FS in 450s. Patient states that had taken her home insulin prior to calling EMS. Denies any pregnancy complications, has + fetal movement. Recent insulin regimine change to Humalin 20 qd, 8 hs, Humalog sliding scale. Vomited once in ED. Endorses Right flank pain. Recent h/o influenza and Cystitis 2 weeks ago. No F/C, HA, Diarrhea, vision changes, CP, Abd pain, hematuria/dysuria. FS 500s in ED, AG 28. Started on insulin gtt U/hr, given 5 U IV push Humalin, 3L LR bolus. 17 y/o F, PMHx of DM1, DKA, Asthma,  26 weeks GA p/w SOB BIBEMS w/ FS in 450s. Patient states that had taken her home insulin prior to calling EMS. Denies any pregnancy complications, has + fetal movement. Recent insulin regimine change to Humalin 20 qd, 8 hs, Humalog sliding scale. Vomited once in ED. Endorses Right flank pain. Recent h/o influenza and Cystitis 2 weeks ago. No F/C, HA, Diarrhea, vision changes, CP, Abd pain, hematuria/dysuria. FS 500s in ED, AG 28. Started on insulin gtt U/hr, given 5 U IV push Humalin, 3L LR bolus. Patient states she missed a few doses of her insulin yesterday.

## 2019-03-18 DIAGNOSIS — Z3A.27 27 WEEKS GESTATION OF PREGNANCY: ICD-10-CM

## 2019-03-18 DIAGNOSIS — E13.10 OTHER SPECIFIED DIABETES MELLITUS WITH KETOACIDOSIS WITHOUT COMA: ICD-10-CM

## 2019-03-18 LAB
ANION GAP SERPL CALC-SCNC: 12 MMOL/L — SIGNIFICANT CHANGE UP (ref 5–17)
ANION GAP SERPL CALC-SCNC: 16 MMOL/L — SIGNIFICANT CHANGE UP (ref 5–17)
B-OH-BUTYR SERPL-SCNC: 6.8 MMOL/L — HIGH
BUN SERPL-MCNC: 12 MG/DL — SIGNIFICANT CHANGE UP (ref 8–20)
BUN SERPL-MCNC: 13 MG/DL — SIGNIFICANT CHANGE UP (ref 8–20)
CALCIUM SERPL-MCNC: 8.6 MG/DL — SIGNIFICANT CHANGE UP (ref 8.6–10.2)
CALCIUM SERPL-MCNC: 9.5 MG/DL — SIGNIFICANT CHANGE UP (ref 8.6–10.2)
CHLORIDE SERPL-SCNC: 106 MMOL/L — SIGNIFICANT CHANGE UP (ref 98–107)
CHLORIDE SERPL-SCNC: 110 MMOL/L — HIGH (ref 98–107)
CO2 SERPL-SCNC: 13 MMOL/L — LOW (ref 22–29)
CO2 SERPL-SCNC: 14 MMOL/L — LOW (ref 22–29)
CREAT SERPL-MCNC: 0.42 MG/DL — LOW (ref 0.5–1.3)
CREAT SERPL-MCNC: 0.63 MG/DL — SIGNIFICANT CHANGE UP (ref 0.5–1.3)
CULTURE RESULTS: SIGNIFICANT CHANGE UP
GLUCOSE BLDC GLUCOMTR-MCNC: 103 MG/DL — HIGH (ref 70–99)
GLUCOSE BLDC GLUCOMTR-MCNC: 106 MG/DL — HIGH (ref 70–99)
GLUCOSE BLDC GLUCOMTR-MCNC: 115 MG/DL — HIGH (ref 70–99)
GLUCOSE BLDC GLUCOMTR-MCNC: 137 MG/DL — HIGH (ref 70–99)
GLUCOSE BLDC GLUCOMTR-MCNC: 149 MG/DL — HIGH (ref 70–99)
GLUCOSE BLDC GLUCOMTR-MCNC: 150 MG/DL — HIGH (ref 70–99)
GLUCOSE BLDC GLUCOMTR-MCNC: 172 MG/DL — HIGH (ref 70–99)
GLUCOSE BLDC GLUCOMTR-MCNC: 190 MG/DL — HIGH (ref 70–99)
GLUCOSE BLDC GLUCOMTR-MCNC: 222 MG/DL — HIGH (ref 70–99)
GLUCOSE BLDC GLUCOMTR-MCNC: 262 MG/DL — HIGH (ref 70–99)
GLUCOSE BLDC GLUCOMTR-MCNC: 321 MG/DL — HIGH (ref 70–99)
GLUCOSE BLDC GLUCOMTR-MCNC: 91 MG/DL — SIGNIFICANT CHANGE UP (ref 70–99)
GLUCOSE SERPL-MCNC: 150 MG/DL — HIGH (ref 70–115)
GLUCOSE SERPL-MCNC: 171 MG/DL — HIGH (ref 70–115)
HBA1C BLD-MCNC: 10.4 % — HIGH (ref 4–5.6)
HCT VFR BLD CALC: 35.2 % — LOW (ref 37–47)
HGB BLD-MCNC: 12.5 G/DL — SIGNIFICANT CHANGE UP (ref 12–16)
MAGNESIUM SERPL-MCNC: 2.7 MG/DL — HIGH (ref 1.6–2.6)
MCHC RBC-ENTMCNC: 31.2 PG — HIGH (ref 27–31)
MCHC RBC-ENTMCNC: 35.5 G/DL — SIGNIFICANT CHANGE UP (ref 32–36)
MCV RBC AUTO: 87.8 FL — SIGNIFICANT CHANGE UP (ref 81–99)
PHOSPHATE SERPL-MCNC: 2.7 MG/DL — SIGNIFICANT CHANGE UP (ref 2.4–4.7)
PLATELET # BLD AUTO: 545 K/UL — HIGH (ref 150–400)
POTASSIUM SERPL-MCNC: 3.6 MMOL/L — SIGNIFICANT CHANGE UP (ref 3.5–5.3)
POTASSIUM SERPL-MCNC: 5.3 MMOL/L — SIGNIFICANT CHANGE UP (ref 3.5–5.3)
POTASSIUM SERPL-SCNC: 3.6 MMOL/L — SIGNIFICANT CHANGE UP (ref 3.5–5.3)
POTASSIUM SERPL-SCNC: 5.3 MMOL/L — SIGNIFICANT CHANGE UP (ref 3.5–5.3)
RBC # BLD: 4.01 M/UL — LOW (ref 4.4–5.2)
RBC # FLD: 13.1 % — SIGNIFICANT CHANGE UP (ref 11–15.6)
SODIUM SERPL-SCNC: 135 MMOL/L — SIGNIFICANT CHANGE UP (ref 135–145)
SODIUM SERPL-SCNC: 136 MMOL/L — SIGNIFICANT CHANGE UP (ref 135–145)
SPECIMEN SOURCE: SIGNIFICANT CHANGE UP
WBC # BLD: 8.9 K/UL — SIGNIFICANT CHANGE UP (ref 4.8–10.8)
WBC # FLD AUTO: 8.9 K/UL — SIGNIFICANT CHANGE UP (ref 4.8–10.8)

## 2019-03-18 PROCEDURE — 99223 1ST HOSP IP/OBS HIGH 75: CPT

## 2019-03-18 PROCEDURE — 76819 FETAL BIOPHYS PROFIL W/O NST: CPT | Mod: 26

## 2019-03-18 PROCEDURE — 76815 OB US LIMITED FETUS(S): CPT | Mod: 26

## 2019-03-18 RX ORDER — DEXTROSE 50 % IN WATER 50 %
25 SYRINGE (ML) INTRAVENOUS ONCE
Qty: 0 | Refills: 0 | Status: DISCONTINUED | OUTPATIENT
Start: 2019-03-18 | End: 2019-03-20

## 2019-03-18 RX ORDER — HUMAN INSULIN 100 [IU]/ML
8 INJECTION, SUSPENSION SUBCUTANEOUS AT BEDTIME
Qty: 0 | Refills: 0 | Status: DISCONTINUED | OUTPATIENT
Start: 2019-03-18 | End: 2019-03-18

## 2019-03-18 RX ORDER — INSULIN LISPRO 100/ML
6 VIAL (ML) SUBCUTANEOUS ONCE
Qty: 0 | Refills: 0 | Status: COMPLETED | OUTPATIENT
Start: 2019-03-18 | End: 2019-03-18

## 2019-03-18 RX ORDER — SODIUM CHLORIDE 9 MG/ML
1000 INJECTION, SOLUTION INTRAVENOUS
Qty: 0 | Refills: 0 | Status: DISCONTINUED | OUTPATIENT
Start: 2019-03-18 | End: 2019-03-20

## 2019-03-18 RX ORDER — HUMAN INSULIN 100 [IU]/ML
20 INJECTION, SUSPENSION SUBCUTANEOUS
Qty: 0 | Refills: 0 | Status: DISCONTINUED | OUTPATIENT
Start: 2019-03-18 | End: 2019-03-20

## 2019-03-18 RX ORDER — DEXTROSE 50 % IN WATER 50 %
12.5 SYRINGE (ML) INTRAVENOUS ONCE
Qty: 0 | Refills: 0 | Status: DISCONTINUED | OUTPATIENT
Start: 2019-03-18 | End: 2019-03-20

## 2019-03-18 RX ORDER — INSULIN LISPRO 100/ML
3 VIAL (ML) SUBCUTANEOUS
Qty: 0 | Refills: 0 | Status: DISCONTINUED | OUTPATIENT
Start: 2019-03-18 | End: 2019-03-18

## 2019-03-18 RX ORDER — DEXTROSE 50 % IN WATER 50 %
12.5 SYRINGE (ML) INTRAVENOUS ONCE
Qty: 0 | Refills: 0 | Status: DISCONTINUED | OUTPATIENT
Start: 2019-03-18 | End: 2019-03-18

## 2019-03-18 RX ORDER — GLUCAGON INJECTION, SOLUTION 0.5 MG/.1ML
1 INJECTION, SOLUTION SUBCUTANEOUS ONCE
Qty: 0 | Refills: 0 | Status: DISCONTINUED | OUTPATIENT
Start: 2019-03-18 | End: 2019-03-20

## 2019-03-18 RX ORDER — HUMAN INSULIN 100 [IU]/ML
10 INJECTION, SUSPENSION SUBCUTANEOUS AT BEDTIME
Qty: 0 | Refills: 0 | Status: DISCONTINUED | OUTPATIENT
Start: 2019-03-18 | End: 2019-03-20

## 2019-03-18 RX ORDER — DEXTROSE 50 % IN WATER 50 %
25 SYRINGE (ML) INTRAVENOUS ONCE
Qty: 0 | Refills: 0 | Status: DISCONTINUED | OUTPATIENT
Start: 2019-03-18 | End: 2019-03-18

## 2019-03-18 RX ORDER — POTASSIUM CHLORIDE 20 MEQ
40 PACKET (EA) ORAL ONCE
Qty: 0 | Refills: 0 | Status: COMPLETED | OUTPATIENT
Start: 2019-03-18 | End: 2019-03-18

## 2019-03-18 RX ORDER — INSULIN LISPRO 100/ML
5 VIAL (ML) SUBCUTANEOUS
Qty: 0 | Refills: 0 | Status: DISCONTINUED | OUTPATIENT
Start: 2019-03-18 | End: 2019-03-20

## 2019-03-18 RX ORDER — MAGNESIUM SULFATE 500 MG/ML
2 VIAL (ML) INJECTION ONCE
Qty: 0 | Refills: 0 | Status: COMPLETED | OUTPATIENT
Start: 2019-03-18 | End: 2019-03-18

## 2019-03-18 RX ORDER — INSULIN LISPRO 100/ML
VIAL (ML) SUBCUTANEOUS
Qty: 0 | Refills: 0 | Status: DISCONTINUED | OUTPATIENT
Start: 2019-03-18 | End: 2019-03-19

## 2019-03-18 RX ORDER — DEXTROSE 50 % IN WATER 50 %
15 SYRINGE (ML) INTRAVENOUS ONCE
Qty: 0 | Refills: 0 | Status: DISCONTINUED | OUTPATIENT
Start: 2019-03-18 | End: 2019-03-20

## 2019-03-18 RX ORDER — HUMAN INSULIN 100 [IU]/ML
20 INJECTION, SUSPENSION SUBCUTANEOUS
Qty: 0 | Refills: 0 | Status: DISCONTINUED | OUTPATIENT
Start: 2019-03-18 | End: 2019-03-18

## 2019-03-18 RX ORDER — SODIUM CHLORIDE 9 MG/ML
1000 INJECTION, SOLUTION INTRAVENOUS
Qty: 0 | Refills: 0 | Status: DISCONTINUED | OUTPATIENT
Start: 2019-03-18 | End: 2019-03-18

## 2019-03-18 RX ORDER — GLUCAGON INJECTION, SOLUTION 0.5 MG/.1ML
1 INJECTION, SOLUTION SUBCUTANEOUS ONCE
Qty: 0 | Refills: 0 | Status: DISCONTINUED | OUTPATIENT
Start: 2019-03-18 | End: 2019-03-18

## 2019-03-18 RX ORDER — ACETAMINOPHEN 500 MG
650 TABLET ORAL ONCE
Qty: 0 | Refills: 0 | Status: COMPLETED | OUTPATIENT
Start: 2019-03-18 | End: 2019-03-18

## 2019-03-18 RX ORDER — INSULIN HUMAN 100 [IU]/ML
INJECTION, SOLUTION SUBCUTANEOUS EVERY 6 HOURS
Qty: 0 | Refills: 0 | Status: DISCONTINUED | OUTPATIENT
Start: 2019-03-18 | End: 2019-03-19

## 2019-03-18 RX ORDER — DEXTROSE 50 % IN WATER 50 %
15 SYRINGE (ML) INTRAVENOUS ONCE
Qty: 0 | Refills: 0 | Status: DISCONTINUED | OUTPATIENT
Start: 2019-03-18 | End: 2019-03-18

## 2019-03-18 RX ADMIN — Medication 40 MILLIEQUIVALENT(S): at 01:27

## 2019-03-18 RX ADMIN — Medication 40 MILLIEQUIVALENT(S): at 05:46

## 2019-03-18 RX ADMIN — POTASSIUM PHOSPHATE, MONOBASIC POTASSIUM PHOSPHATE, DIBASIC 62.5 MILLIMOLE(S): 236; 224 INJECTION, SOLUTION INTRAVENOUS at 01:21

## 2019-03-18 RX ADMIN — Medication 650 MILLIGRAM(S): at 04:56

## 2019-03-18 RX ADMIN — HUMAN INSULIN 20 UNIT(S): 100 INJECTION, SUSPENSION SUBCUTANEOUS at 07:52

## 2019-03-18 RX ADMIN — Medication 650 MILLIGRAM(S): at 04:17

## 2019-03-18 RX ADMIN — Medication 6 UNIT(S): at 19:02

## 2019-03-18 RX ADMIN — HUMAN INSULIN 10 UNIT(S): 100 INJECTION, SUSPENSION SUBCUTANEOUS at 22:38

## 2019-03-18 RX ADMIN — Medication 1 TABLET(S): at 15:39

## 2019-03-18 RX ADMIN — Medication 50 GRAM(S): at 01:27

## 2019-03-18 RX ADMIN — Medication 3 UNIT(S): at 17:54

## 2019-03-18 NOTE — CONSULT NOTE ADULT - SUBJECTIVE AND OBJECTIVE BOX
HPI:  19 y/o F, PMHx of DM1, DKA, Asthma,  26 weeks GA p/w SOB BIBEMS w/ FS in 450s. Patient states that had taken her home insulin prior to calling EMS. Denies any pregnancy complications, has + fetal movement. Recent insulin regimine change to Humalin NPH 20 units in am and 8 units at bedtime. Humalog sliding scale. Vomited once in ED. Endorses Right flank pain. Recent h/o influenza and Cystitis 2 weeks ago. No F/C, HA, Diarrhea, vision changes, CP, Abd pain, hematuria/dysuria. FS 500s in ED, AG 28. Started on insulin gtt U/hr, given 5 U IV push Humalin, 3L LR bolus. Patient states she missed a few doses of her insulin yesterday. (17 Mar 2019 18:33)  DKA resolved after insulin drip and aggressive IV hydration and she is feeling better. No complaints at this time      PAST MEDICAL & SURGICAL HISTORY:  Asthma  Diabetes  Diabetes mellitus type 1  S/P tonsillectomy and adenoidectomy  S/P tonsillectomy and adenoidectomy      FAMILY HISTORY:  No pertinent family history in first degree relatives    SOCIAL HISTORY: Denies ETOH, smoking and drugs.     REVIEW OF SYSTEMS:  Constitutional: No fever, no chills, no change in weight.  Eyes: No blurry vision  Lungs: No shortness of breath, no wheezing, no cough  CV: No chest pain, no palpitations, no pain with walking.  GI: No nausea, no vomiting, no abdominal pain  : No urinary frequency, no blood in urine  Skin: No rash, no infections.  Neurologic: No headaches, no weakness, no burning or pain in feet  Endocrine: No heat intolerance, no cold intolerance, no increased sweating, no shakiness between meals.  Psych: No depression, no anxiety, no trouble concentrating      MEDICATIONS  (STANDING):  dextrose 5%. 1000 milliLiter(s) (50 mL/Hr) IV Continuous <Continuous>  dextrose 50% Injectable 12.5 Gram(s) IV Push once  dextrose 50% Injectable 25 Gram(s) IV Push once  dextrose 50% Injectable 25 Gram(s) IV Push once  enoxaparin Injectable 40 milliGRAM(s) SubCutaneous daily  insulin lispro (HumaLOG) corrective regimen sliding scale   SubCutaneous Before meals and at bedtime  insulin lispro Injectable (HumaLOG) 3 Unit(s) SubCutaneous before breakfast  insulin lispro Injectable (HumaLOG) 3 Unit(s) SubCutaneous before lunch  insulin lispro Injectable (HumaLOG) 3 Unit(s) SubCutaneous before dinner  insulin NPH human recombinant 20 Unit(s) SubCutaneous before breakfast  insulin NPH human recombinant 10 Unit(s) SubCutaneous at bedtime  ondansetron Injectable 4 milliGRAM(s) IV Push once  prenatal multivitamin 1 Tablet(s) Oral daily    MEDICATIONS  (PRN):  dextrose 40% Gel 15 Gram(s) Oral once PRN Blood Glucose LESS THAN 70 milliGRAM(s)/deciliter  glucagon  Injectable 1 milliGRAM(s) IntraMuscular once PRN Glucose LESS THAN 70 milligrams/deciliter      Allergies  apple (Unknown)  No Known Drug Allergies  Pears (Unknown)    PHYSICAL EXAM:    Vital Signs Last 24 Hrs  T(C): 36.7 (18 Mar 2019 13:40), Max: 37 (17 Mar 2019 23:00)  T(F): 98.1 (18 Mar 2019 13:40), Max: 98.6 (17 Mar 2019 23:00)  HR: 80 (18 Mar 2019 13:40) (79 - 118)  BP: 128/86 (18 Mar 2019 13:40) (104/66 - 150/94)  BP(mean): 95 (18 Mar 2019 12:00) (76 - 102)  RR: 18 (18 Mar 2019 13:40) (11 - 35)  SpO2: 99% (18 Mar 2019 13:40) (98% - 100%)    General appearance: Well developed, well nourished.  Eyes: Pupils equal and reactive to light. EOMI  Lungs: CTAB no w/r/r  CV: Regular S1S2, no m/r/g  Abdomen: Soft, non tender, (+) BS  Musculoskeletal: No cyanosis, clubbing, or edema.   Skin: Warm and moist. No rash. No acanthosis.  Neuro: Cranial nerves intact. Normal motor and sensory function. DTR's normal.  Psych: Normal affect, good judgement.      LABS:                        12.5   8.9   )-----------( 545      ( 18 Mar 2019 04:24 )             35.2     03-18    136  |  110<H>  |  12.0  ----------------------------<  171<H>  5.3   |  14.0<L>  |  0.42<L>    Ca    8.6      18 Mar 2019 08:35  Phos  2.7       Mg     2.7         TPro  9.7<H>  /  Alb  4.4  /  TBili  <0.2<L>  /  DBili  x   /  AST  17  /  ALT  16  /  AlkPhos  131<H>      Urinalysis Basic - ( 17 Mar 2019 17:14 )    Color: Yellow / Appearance: Clear / S.020 / pH: x  Gluc: x / Ketone: Large  / Bili: Negative / Urobili: Negative mg/dL   Blood: x / Protein: 100 mg/dL / Nitrite: Negative   Leuk Esterase: Negative / RBC: 0-2 /HPF / WBC 0-2   Sq Epi: x / Non Sq Epi: Occasional / Bacteria: Occasional      LIVER FUNCTIONS - ( 17 Mar 2019 16:05 )  Alb: 4.4 g/dL / Pro: 9.7 g/dL / ALK PHOS: 131 U/L / ALT: 16 U/L / AST: 17 U/L / GGT: x           Hemoglobin A1C, Whole Blood: 10.4 % ( @ 12:13)    CAPILLARY BLOOD GLUCOSE  POCT Blood Glucose.: 222 mg/dL (18 Mar 2019 11:24)  POCT Blood Glucose.: 150 mg/dL (18 Mar 2019 07:37)  POCT Blood Glucose.: 103 mg/dL (18 Mar 2019 06:37)  POCT Blood Glucose.: 91 mg/dL (18 Mar 2019 05:32)  POCT Blood Glucose.: 115 mg/dL (18 Mar 2019 04:45)  POCT Blood Glucose.: 137 mg/dL (18 Mar 2019 03:32)  POCT Blood Glucose.: 149 mg/dL (18 Mar 2019 02:44)  POCT Blood Glucose.: 190 mg/dL (18 Mar 2019 01:46)  POCT Blood Glucose.: 172 mg/dL (18 Mar 2019 00:39)  POCT Blood Glucose.: 174 mg/dL (17 Mar 2019 23:25)  POCT Blood Glucose.: 191 mg/dL (17 Mar 2019 22:24)  POCT Blood Glucose.: 185 mg/dL (17 Mar 2019 21:21)  POCT Blood Glucose.: 212 mg/dL (17 Mar 2019 20:19)  POCT Blood Glucose.: 279 mg/dL (17 Mar 2019 19:27)  POCT Blood Glucose.: 496 mg/dL (17 Mar 2019 18:05)  POCT Blood Glucose.: >530 mg/dL (17 Mar 2019 15:39)

## 2019-03-18 NOTE — CONSULT NOTE ADULT - SUBJECTIVE AND OBJECTIVE BOX
A 17yo  EDC 2019 at Gestational Age 27 3/7 weeks who presented to ER with shortness of breath. Had taken her home insulin prior to calling EMS and had fingersticks in the 450s. She had one episode of emesis in the ER. Recently had influenza and cystitis 2 weeks ago.     OB hx:   GYN hx:  PMH: DM1, asthma   PSH: thyroidectomy, adenoidectomy   Meds: dextrose 5% + lactated ringers. 1000 milliLiter(s) IV Continuous <Continuous>  enoxaparin Injectable 40 milliGRAM(s) SubCutaneous daily  insulin Infusion 2 Unit(s)/Hr IV Continuous <Continuous>  insulin NPH human recombinant 20 Unit(s) SubCutaneous before breakfast  insulin NPH human recombinant 8 Unit(s) SubCutaneous at bedtime  ondansetron Injectable 4 milliGRAM(s) IV Push once    Allergies: apple (Unknown)  No Known Drug Allergies  Pears (Unknown)    FAMILY HISTORY:  No pertinent family history in first degree relatives    Social History: Denies ETOH, smoking and drugs.     REVIEW OF SYSTEMS:  Constitutional: No weight loss, fever, chills, weakness or fatigue.  Cardiovascular: No chest pain, chest pressure or chest discomfort. No palpitations or edema.  Respiratory: No shortness of breath, cough or sputum.  Gastrointestinal: No anorexia, nausea, vomiting or diarrhea. No abdominal pain or blood.  Genitourinary: No pain or burning on urination.   Neurological: No headache, dizziness, syncope  Hematologic: No anemia, bleeding or bruising  Psychiatric: No history of depression or anxiety.  Endocrinologic: No reports of sweating, cold or heat intolerance.  Allergies: + asthma, no hives, eczema or rhinitis.    MEDICATIONS  (STANDING):  dextrose 5% + lactated ringers. 1000 milliLiter(s) (200 mL/Hr) IV Continuous <Continuous>  enoxaparin Injectable 40 milliGRAM(s) SubCutaneous daily  insulin Infusion 2 Unit(s)/Hr (2 mL/Hr) IV Continuous <Continuous>  insulin NPH human recombinant 20 Unit(s) SubCutaneous before breakfast  insulin NPH human recombinant 8 Unit(s) SubCutaneous at bedtime  ondansetron Injectable 4 milliGRAM(s) IV Push once    MEDICATIONS  (PRN):      Fetal Monitoring:   PENDING     Vital Signs:  Vital Signs Last 24 Hrs  T(C): 36.7 (18 Mar 2019 04:00), Max: 37 (17 Mar 2019 23:00)  T(F): 98.1 (18 Mar 2019 04:00), Max: 98.6 (17 Mar 2019 23:00)  HR: 79 (18 Mar 2019 07:00) (79 - 118)  BP: 122/78 (18 Mar 2019 07:00) (104/66 - 150/94)  BP(mean): 95 (18 Mar 2019 07:00) (76 - 96)  RR: 20 (18 Mar 2019 07:00) (11 - 35)  SpO2: 100% (18 Mar 2019 07:00) (98% - 100%)    Physical Exam:  General: NAD  CVS: RRR  Lungs: CTA  Abdomen: +BS, soft, NT.  Pelvic: deferred   Ext: No cyanosis, edema or calf tenderness.     Labs:                        12.5   8.9   )-----------( 545      ( 18 Mar 2019 04:24 )             35.2   03-18    135  |  106  |  13.0  ----------------------------<  150<H>  3.6   |  13.0<L>  |  0.63    Ca    9.5      18 Mar 2019 04:23  Phos  2.7     03-  Mg     2.7     -18    TPro  9.7<H>  /  Alb  4.4  /  TBili  <0.2<L>  /  DBili  x   /  AST  17  /  ALT  16  /  AlkPhos  131<H>            Radiology:  < from: US OB Fetus Limited (19 @ 04:17) >     EXAM:  US OB FETALBIOPHYS WO NONSTRES                         EXAM:  US OB LTD FETUS(ES)                          PROCEDURE DATE:  2019          INTERPRETATION:  Pelvic sonogram dated 3/18/2019.    CLINICAL INFORMATION: Pregnant female with DKA and respiratory distress,   assess fetal well-being. Approximately 27 weeks pregnant.    LMP: Unknown.    TECHNIQUE: Transabdominal pelvic sonogram is performed.    The study is correlated with a prior exam from 2019.    FINDINGS:    There is a single intrauterine gestational sac containing a live fetus.   Fetal cardiac activity is detected at 161 bpm. Fetal lie is cephalic. The   placenta is posterior. ENRIQUE sign 18.1 cm. BPP = 8/8. Cervical length is   within normal limits on transabdominal imaging measuring 3.5 cm.    Fetal biometry:    BPD = 6.7 cm corresponding to 27 weeks 0 days  HC = 24.8 cm corresponding to 27 weeks 0 days  AC = 22.9 cm corresponding to 27 weeks 2 days  FL = 5.0 cm corresponding to 27 weeks 0 days    Estimated gestational age by ultrasound: 27 weeks 1 day.    Estimated delivery date by ultrasound: 2019    Estimated fetal weight: 1035 g (2 lbs. 5 oz.).    IMPRESSION:    Single viable intrauterine gestation, as described. BPP = .                RADHA SKY D.O.; ATTENDING RADIOLOGIST  This document has been electronically signed. Mar 18 2019  4:28AM        < end of copied text > A 19yo  EDC 2019 at Gestational Age 27 3/7 weeks with pregestational diabetes Class D since age 2yo. Does not recall LMP but knows that is how she is dated. She presented to ER with shortness of breath. She has a history of asthma and thought she was having an asthma attack so she called EMS. Had taken her home insulin prior to calling EMS and had fingersticks in the 450s. She had one episode of emesis in the ER. She was recently hospitalized 2-3 weeks ago for pyelonephritis and was treated with IV antibiotics for about 1 week. She was sent home on PO antibiotics and finished her course within the last 5 days.   She sees Dr. Glenroy Yeh for routine OB care and sees an MFM but is unsure of the name. Insulin regimen managed by [facility] endocrinologist.     OB hx: SAB x2  GYN hx: Previously on birth control, borderline compliance- second pregnancy was while she was taking OCPs.   PMH:   -DM1 Class D: since 2yo, pt states she has been poorly controlled throughout childhood and sugars have been more tightly controlled during the pregnancy. Has been hospitalized around 10 times in her life for DKA, last time was at the very beginning of pregnancy. Insulin regimen is managed by an endocrinologist at [facility] but she does not recall the name.   -Asthma: PRN inhaler. Last attack was in the beginning of pregnancy. Has never required hospitalization or intubation.     PSH: thyroidectomy, adenoidectomy   Meds: dextrose 5% + lactated ringers. 1000 milliLiter(s) IV Continuous <Continuous>  enoxaparin Injectable 40 milliGRAM(s) SubCutaneous daily  insulin Infusion 2 Unit(s)/Hr IV Continuous <Continuous>  insulin NPH human recombinant 20 Unit(s) SubCutaneous before breakfast  insulin NPH human recombinant 8 Unit(s) SubCutaneous at bedtime  ondansetron Injectable 4 milliGRAM(s) IV Push once    Allergies: apple (Unknown)  No Known Drug Allergies  Pears (Unknown)    FAMILY HISTORY:  Grandmother and grandfather with DM2     Social History: Denies ETOH, smoking and drugs.     REVIEW OF SYSTEMS:  Constitutional: No weight loss, fever, chills, weakness or fatigue.  Cardiovascular: No chest pain, chest pressure or chest discomfort. No palpitations   Respiratory: No shortness of breath, cough or sputum.  Gastrointestinal: No anorexia, nausea, vomiting or diarrhea. No abdominal pain  Genitourinary: No pain or burning on urination.   Neurological: No headache, dizziness, syncope  Hematologic: No anemia, bleeding or bruising  Psychiatric: No history of depression or anxiety.  Endocrinologic: No reports of sweating, cold or heat intolerance.  Allergies: + asthma, no hives, eczema or rhinitis.    MEDICATIONS  (STANDING):  dextrose 5% + lactated ringers. 1000 milliLiter(s) (200 mL/Hr) IV Continuous <Continuous>  enoxaparin Injectable 40 milliGRAM(s) SubCutaneous daily  insulin Infusion 2 Unit(s)/Hr (2 mL/Hr) IV Continuous <Continuous>  insulin NPH human recombinant 20 Unit(s) SubCutaneous before breakfast  insulin NPH human recombinant 8 Unit(s) SubCutaneous at bedtime  ondansetron Injectable 4 milliGRAM(s) IV Push once        Fetal Monitoring:   PENDING     Vital Signs:  Vital Signs Last 24 Hrs  T(C): 36.7 (18 Mar 2019 04:00), Max: 37 (17 Mar 2019 23:00)  T(F): 98.1 (18 Mar 2019 04:00), Max: 98.6 (17 Mar 2019 23:00)  HR: 79 (18 Mar 2019 07:00) (79 - 118)  BP: 122/78 (18 Mar 2019 07:00) (104/66 - 150/94)  BP(mean): 95 (18 Mar 2019 07:00) (76 - 96)  RR: 20 (18 Mar 2019 07:00) (11 - 35)  SpO2: 100% (18 Mar 2019 07:00) (98% - 100%)    Physical Exam:  General: NAD  CVS: RRR  Lungs: CTA  Abdomen: +BS, soft, NT.  Pelvic: deferred   Ext: No cyanosis, edema or calf tenderness.     Labs:                        12.5   8.9   )-----------( 545      ( 18 Mar 2019 04:24 )             35.2   03-18    135  |  106  |  13.0  ----------------------------<  150<H>  3.6   |  13.0<L>  |  0.63    Ca    9.5      18 Mar 2019 04:23  Phos  2.7     03-18  Mg     2.7     03-18    TPro  9.7<H>  /  Alb  4.4  /  TBili  <0.2<L>  /  DBili  x   /  AST  17  /  ALT  16  /  AlkPhos  131<H>            Radiology:  < from: US OB Fetus Limited (19 @ 04:17) >     EXAM:  US OB FETALBIOPHYS WO NONSTRES                         EXAM:  US OB LTD FETUS(ES)                          PROCEDURE DATE:  2019          INTERPRETATION:  Pelvic sonogram dated 3/18/2019.    CLINICAL INFORMATION: Pregnant female with DKA and respiratory distress,   assess fetal well-being. Approximately 27 weeks pregnant.    LMP: Unknown.    TECHNIQUE: Transabdominal pelvic sonogram is performed.    The study is correlated with a prior exam from 2019.    FINDINGS:    There is a single intrauterine gestational sac containing a live fetus.   Fetal cardiac activity is detected at 161 bpm. Fetal lie is cephalic. The   placenta is posterior. ENRIQUE sign 18.1 cm. BPP = 8/8. Cervical length is   within normal limits on transabdominal imaging measuring 3.5 cm.    Fetal biometry:    BPD = 6.7 cm corresponding to 27 weeks 0 days  HC = 24.8 cm corresponding to 27 weeks 0 days  AC = 22.9 cm corresponding to 27 weeks 2 days  FL = 5.0 cm corresponding to 27 weeks 0 days    Estimated gestational age by ultrasound: 27 weeks 1 day.    Estimated delivery date by ultrasound: 2019    Estimated fetal weight: 1035 g (2 lbs. 5 oz.).    IMPRESSION:    Single viable intrauterine gestation, as described. BPP = 8/8.                RADHA SKY D.O.; ATTENDING RADIOLOGIST  This document has been electronically signed. Mar 18 2019  4:28AM        < end of copied text >

## 2019-03-18 NOTE — CONSULT NOTE ADULT - ASSESSMENT
A 17yo  EDC 2019 at Gestational Age 27 3/7 weeks with DKA, SIRS, respiratory distress A 19yo  EDC 2019 by LMP at Gestational Age 27 3/7 weeks with DKA

## 2019-03-18 NOTE — CONSULT NOTE ADULT - ASSESSMENT
19 y/o F, PMHx of DM1, DKA, Asthma,  26 weeks GA admitted with DKA/uncontrolled diabetes due to nonadherence with insulin and recent infection. DKA resolved after IV insulin and IV hydration and glucoses improved. She reports poor appetite at this time but noted to have cookies/soda at bedside  - I discussed with her importance of adherence with insulin and good glycemic control especially during pregnancy.  We also discussed complications/risks of uncontrolled diabetes while pregnant  - cont current NPH and Prandial insulin doses, she may need more prandial insulin as diet improves  - will need outpatient endocrine follow up to help manage diabetes during pregnancy.  - encouraged adherence with diabetic diet  - will follow

## 2019-03-18 NOTE — CONSULT NOTE ADULT - NSICDXPROBLEM_GEN_ALL_CORE_FT
PROBLEM DIAGNOSES  Problem: 27 weeks gestation of pregnancy  Recommendation: Patient had ultrasound on 3/18. BPP 8/8, ENRIQUE 18. EFW 1035g. Recommend nonstress fetal testing q shift once medically stabilized.    Problem: DKA (diabetic ketoacidoses)  Recommendation: Patient on insulin infusion with accuchecks q 1 hour. Had agressive fluid resuscitation in ER. IVF continued at 200cc/hour. BMP q 4 hours. Replacing K+, Mag, Phos, as needed. Cultures penidng. Endocrine and diabetes specialist consults pending. PROBLEM DIAGNOSES  Problem: 27 weeks gestation of pregnancy  Recommendation: Once medically stable, plan is for downgrade to the antepartum service under Dr. Ahumada on 2 E. Patient had ultrasound on 3/18. BPP 8/8, ENRIQUE 18. EFW 1035g. Recommend nonstress fetal testing q shift once medically stabilized.     Problem: Type 1 diabetes mellitus with ketoacidosis  Recommendation: -Patient previously on insulin infusion with accuchecks q 1 hour s/p agressive fluid resuscitation in ER. Insulin infusion discontinued, received 20 units of NPH in coordinance with her home regimen. Home regimen consists of 20 units long-acting in AM and 8 units long acting at bedtime. She is on a carbohydrate counting meal coverage that her mom helps her with, does not know the details. Discussed with PA to give her 3-4 units for meal time coverage and to titrate as necessary with the diabetic educator.   -BMP sent at 8am, results penidng. Will replace K+, Mag, Phos, as needed.   -Recent history of pyelonephritis which could have put her at risk for developing DKA. Cultures penidng.   -Will discuss with attending need for endocrinology inpatient consult as well if necssary

## 2019-03-19 DIAGNOSIS — Z91.19 PATIENT'S NONCOMPLIANCE WITH OTHER MEDICAL TREATMENT AND REGIMEN: ICD-10-CM

## 2019-03-19 DIAGNOSIS — E87.6 HYPOKALEMIA: ICD-10-CM

## 2019-03-19 DIAGNOSIS — O99.512 DISEASES OF THE RESPIRATORY SYSTEM COMPLICATING PREGNANCY, SECOND TRIMESTER: ICD-10-CM

## 2019-03-19 LAB
ANION GAP SERPL CALC-SCNC: 14 MMOL/L — SIGNIFICANT CHANGE UP (ref 5–17)
BUN SERPL-MCNC: 8 MG/DL — SIGNIFICANT CHANGE UP (ref 8–20)
CALCIUM SERPL-MCNC: 8.2 MG/DL — LOW (ref 8.6–10.2)
CHLORIDE SERPL-SCNC: 106 MMOL/L — SIGNIFICANT CHANGE UP (ref 98–107)
CO2 SERPL-SCNC: 15 MMOL/L — LOW (ref 22–29)
CREAT SERPL-MCNC: 0.5 MG/DL — SIGNIFICANT CHANGE UP (ref 0.5–1.3)
FIBRONECTIN FETAL SPEC QL: NEGATIVE — SIGNIFICANT CHANGE UP
GLUCOSE BLDC GLUCOMTR-MCNC: 121 MG/DL — HIGH (ref 70–99)
GLUCOSE BLDC GLUCOMTR-MCNC: 122 MG/DL — HIGH (ref 70–99)
GLUCOSE BLDC GLUCOMTR-MCNC: 126 MG/DL — HIGH (ref 70–99)
GLUCOSE BLDC GLUCOMTR-MCNC: 134 MG/DL — HIGH (ref 70–99)
GLUCOSE BLDC GLUCOMTR-MCNC: 148 MG/DL — HIGH (ref 70–99)
GLUCOSE BLDC GLUCOMTR-MCNC: 188 MG/DL — HIGH (ref 70–99)
GLUCOSE BLDC GLUCOMTR-MCNC: 208 MG/DL — HIGH (ref 70–99)
GLUCOSE BLDC GLUCOMTR-MCNC: 209 MG/DL — HIGH (ref 70–99)
GLUCOSE BLDC GLUCOMTR-MCNC: 40 MG/DL — CRITICAL LOW (ref 70–99)
GLUCOSE BLDC GLUCOMTR-MCNC: 87 MG/DL — SIGNIFICANT CHANGE UP (ref 70–99)
GLUCOSE SERPL-MCNC: 171 MG/DL — HIGH (ref 70–115)
MAGNESIUM SERPL-MCNC: 1.5 MG/DL — LOW (ref 1.6–2.6)
PHOSPHATE SERPL-MCNC: 2.3 MG/DL — LOW (ref 2.4–4.7)
POTASSIUM SERPL-MCNC: 3.2 MMOL/L — LOW (ref 3.5–5.3)
POTASSIUM SERPL-SCNC: 3.2 MMOL/L — LOW (ref 3.5–5.3)
SODIUM SERPL-SCNC: 135 MMOL/L — SIGNIFICANT CHANGE UP (ref 135–145)

## 2019-03-19 PROCEDURE — 76820 UMBILICAL ARTERY ECHO: CPT | Mod: 26

## 2019-03-19 PROCEDURE — 99232 SBSQ HOSP IP/OBS MODERATE 35: CPT | Mod: GC

## 2019-03-19 PROCEDURE — 93976 VASCULAR STUDY: CPT | Mod: 26

## 2019-03-19 PROCEDURE — 99232 SBSQ HOSP IP/OBS MODERATE 35: CPT

## 2019-03-19 PROCEDURE — 76819 FETAL BIOPHYS PROFIL W/O NST: CPT | Mod: 26

## 2019-03-19 PROCEDURE — 76811 OB US DETAILED SNGL FETUS: CPT | Mod: 26

## 2019-03-19 PROCEDURE — 76821 MIDDLE CEREBRAL ARTERY ECHO: CPT | Mod: 26

## 2019-03-19 RX ORDER — INSULIN LISPRO 100/ML
VIAL (ML) SUBCUTANEOUS
Qty: 0 | Refills: 0 | Status: DISCONTINUED | OUTPATIENT
Start: 2019-03-19 | End: 2019-03-20

## 2019-03-19 RX ORDER — POTASSIUM CHLORIDE 20 MEQ
40 PACKET (EA) ORAL ONCE
Qty: 0 | Refills: 0 | Status: COMPLETED | OUTPATIENT
Start: 2019-03-19 | End: 2019-03-19

## 2019-03-19 RX ORDER — DEXTROSE 50 % IN WATER 50 %
25 SYRINGE (ML) INTRAVENOUS ONCE
Qty: 0 | Refills: 0 | Status: COMPLETED | OUTPATIENT
Start: 2019-03-19 | End: 2019-03-19

## 2019-03-19 RX ORDER — DEXTROSE 50 % IN WATER 50 %
15 SYRINGE (ML) INTRAVENOUS ONCE
Qty: 0 | Refills: 0 | Status: DISCONTINUED | OUTPATIENT
Start: 2019-03-19 | End: 2019-03-20

## 2019-03-19 RX ORDER — INSULIN LISPRO 100/ML
4 VIAL (ML) SUBCUTANEOUS ONCE
Qty: 0 | Refills: 0 | Status: COMPLETED | OUTPATIENT
Start: 2019-03-19 | End: 2019-03-19

## 2019-03-19 RX ORDER — INSULIN LISPRO 100/ML
VIAL (ML) SUBCUTANEOUS AT BEDTIME
Qty: 0 | Refills: 0 | Status: DISCONTINUED | OUTPATIENT
Start: 2019-03-19 | End: 2019-03-20

## 2019-03-19 RX ORDER — INSULIN LISPRO 100/ML
VIAL (ML) SUBCUTANEOUS
Qty: 0 | Refills: 0 | Status: DISCONTINUED | OUTPATIENT
Start: 2019-03-19 | End: 2019-03-19

## 2019-03-19 RX ORDER — SODIUM CHLORIDE 9 MG/ML
1000 INJECTION, SOLUTION INTRAVENOUS ONCE
Qty: 0 | Refills: 0 | Status: COMPLETED | OUTPATIENT
Start: 2019-03-19 | End: 2019-03-19

## 2019-03-19 RX ADMIN — Medication 2: at 13:38

## 2019-03-19 RX ADMIN — Medication 2: at 10:30

## 2019-03-19 RX ADMIN — HUMAN INSULIN 10 UNIT(S): 100 INJECTION, SUSPENSION SUBCUTANEOUS at 21:59

## 2019-03-19 RX ADMIN — Medication 1 TABLET(S): at 13:38

## 2019-03-19 RX ADMIN — Medication 5 UNIT(S): at 17:21

## 2019-03-19 RX ADMIN — Medication 4 UNIT(S): at 01:36

## 2019-03-19 RX ADMIN — HUMAN INSULIN 20 UNIT(S): 100 INJECTION, SUSPENSION SUBCUTANEOUS at 08:35

## 2019-03-19 RX ADMIN — Medication 25 GRAM(S): at 05:35

## 2019-03-19 RX ADMIN — Medication 5 UNIT(S): at 12:26

## 2019-03-19 RX ADMIN — SODIUM CHLORIDE 2000 MILLILITER(S): 9 INJECTION, SOLUTION INTRAVENOUS at 00:16

## 2019-03-19 RX ADMIN — Medication 40 MILLIEQUIVALENT(S): at 10:33

## 2019-03-19 RX ADMIN — Medication 5 UNIT(S): at 08:50

## 2019-03-19 RX ADMIN — Medication 4: at 18:37

## 2019-03-19 NOTE — PROGRESS NOTE ADULT - ASSESSMENT
19 y/o F, PMHx of DM1, DKA, Asthma,  26 weeks GA admitted with DKA/uncontrolled diabetes due to nonadherence with insulin and recent infection. DKA resolved after IV insulin and IV hydration and glucoses improved.   - cont current NPH and Prandial insulin doses  - will need outpatient endocrine follow up to help manage diabetes during pregnancy.  - encouraged adherence with diabetic diet  - test premeal and 1 hour post meals. if 1 hour post meals elevated - she will likely need more prandial insulin

## 2019-03-19 NOTE — CONSULT NOTE ADULT - ATTENDING COMMENTS
the patient was evaluated with the resident.
Patient examined with resident. Recommendations discussed with resident, OB provider and RN staff.

## 2019-03-19 NOTE — PROGRESS NOTE ADULT - SUBJECTIVE AND OBJECTIVE BOX
CC: f/u T1DM s/p DKA  INTERVAL HPI/OVERNIGHT EVENTS: hypoglycemia this am    MEDICATIONS  (STANDING):  dextrose 5%. 1000 milliLiter(s) (50 mL/Hr) IV Continuous <Continuous>  dextrose 50% Injectable 12.5 Gram(s) IV Push once  dextrose 50% Injectable 25 Gram(s) IV Push once  dextrose 50% Injectable 25 Gram(s) IV Push once  enoxaparin Injectable 40 milliGRAM(s) SubCutaneous daily  insulin lispro (HumaLOG) corrective regimen sliding scale   SubCutaneous three times a day with meals  insulin lispro (HumaLOG) corrective regimen sliding scale   SubCutaneous at bedtime  insulin lispro Injectable (HumaLOG) 5 Unit(s) SubCutaneous before breakfast  insulin lispro Injectable (HumaLOG) 5 Unit(s) SubCutaneous before lunch  insulin lispro Injectable (HumaLOG) 5 Unit(s) SubCutaneous with dinner  insulin NPH human recombinant 20 Unit(s) SubCutaneous before breakfast  insulin NPH human recombinant 10 Unit(s) SubCutaneous at bedtime  ondansetron Injectable 4 milliGRAM(s) IV Push once  prenatal multivitamin 1 Tablet(s) Oral daily    MEDICATIONS  (PRN):  dextrose 40% Gel 15 Gram(s) Oral once PRN Blood Glucose LESS THAN 70 milliGRAM(s)/deciliter  dextrose 40% Gel 15 Gram(s) Oral once PRN Blood Glucose LESS THAN 70 milliGRAM(s)/deciliter  glucagon  Injectable 1 milliGRAM(s) IntraMuscular once PRN Glucose LESS THAN 70 milligrams/deciliter      Allergies  apple (Unknown)  No Known Drug Allergies  Pears (Unknown)    Vital Signs Last 24 Hrs  T(C): 37.1 (19 Mar 2019 11:40), Max: 37.1 (19 Mar 2019 11:40)  T(F): 98.78 (19 Mar 2019 11:40), Max: 98.78 (19 Mar 2019 11:40)  HR: 100 (19 Mar 2019 13:09) (63 - 100)  BP: 128/83 (19 Mar 2019 13:09) (114/82 - 133/85)  BP(mean): --  RR: 18 (19 Mar 2019 08:28) (18 - 18)  SpO2: 99% (19 Mar 2019 04:10) (99% - 99%)    PHYSICAL EXAM:  General appearance: Well developed, well nourished.  Eyes: Pupils equal and reactive to light. EOMI  Lungs: CTAB no w/r/r  CV: Regular S1S2, no m/r/g  Abdomen: Soft, non tender, (+) BS  Musculoskeletal: No cyanosis, clubbing, or edema.   Skin: Warm and moist. No rash. No acanthosis.  Neuro: Cranial nerves intact. Normal motor and sensory function. DTR's normal.        LABS:                        12.5   8.9   )-----------( 545      ( 18 Mar 2019 04:24 )             35.2         135  |  106  |  8.0  ----------------------------<  171<H>  3.2<L>   |  15.0<L>  |  0.50    Ca    8.2<L>      19 Mar 2019 07:13  Phos  2.3       Mg     1.5           Urinalysis Basic - ( 17 Mar 2019 17:14 )    Color: Yellow / Appearance: Clear / S.020 / pH: x  Gluc: x / Ketone: Large  / Bili: Negative / Urobili: Negative mg/dL   Blood: x / Protein: 100 mg/dL / Nitrite: Negative   Leuk Esterase: Negative / RBC: 0-2 /HPF / WBC 0-2   Sq Epi: x / Non Sq Epi: Occasional / Bacteria: Occasional    Hemoglobin A1C, Whole Blood: 10.4 % <H> [4.0 - 5.6] (19 @ 12:13)    CAPILLARY BLOOD GLUCOSE  POCT Blood Glucose.: 134 mg/dL (19 Mar 2019 13:32)  POCT Blood Glucose.: 126 mg/dL (19 Mar 2019 12:16)  POCT Blood Glucose.: 148 mg/dL (19 Mar 2019 09:59)  POCT Blood Glucose.: 122 mg/dL (19 Mar 2019 08:23)  POCT Blood Glucose.: 209 mg/dL (19 Mar 2019 05:53)  POCT Blood Glucose.: 40 mg/dL (19 Mar 2019 05:25)  POCT Blood Glucose.: 208 mg/dL (19 Mar 2019 00:50)  POCT Blood Glucose.: 106 mg/dL (18 Mar 2019 21:45)  POCT Blood Glucose.: 321 mg/dL (18 Mar 2019 18:37)  POCT Blood Glucose.: 262 mg/dL (18 Mar 2019 17:42)

## 2019-03-19 NOTE — CHART NOTE - NSCHARTNOTEFT_GEN_A_CORE
17yo  at 27w4d with T1DM and asthma admitted for management of DKA s/p insulin drip now admitted to antepartum service.  Pt down in L&D for routine NST.   Denies CTX, VB, or LOF. +FM.    FHT: Cat 1  [place]: few infrequent contractions noted earlier, resolved with urination. Uterine irritability  SVE: FT/50/-3  SSE: No active bleeding, green-mitzy discharged noted, Affirm and FFN swabbed    Plan:  1L LR [place]  FFN  Affirm    D/W Dr. Leon

## 2019-03-19 NOTE — CONSULT NOTE ADULT - ASSESSMENT
A 19yo  EDC 2019 by LMP at Gestational Age 27 4/7 weeks with DKA A 19 yo  EDC 2019 by LMP at Gestational Age 27 4/7 weeks. Admitted with DKA. She has mild bronchial asthma. A 17 yo  EDC 2019 by LMP at Gestational Age 27 4/7 weeks. Admitted with DKA. She has mild bronchial asthma.

## 2019-03-19 NOTE — CONSULT NOTE ADULT - NSICDXPROBLEM_GEN_ALL_CORE_FT
PROBLEM DIAGNOSES  Problem: 27 weeks gestation of pregnancy  Recommendation: Patient had ultrasound on 3/18. BPP 8/8, ENRIQUE 18. EFW 1035g. NST overall category 1, however 1 deep variable deceleration noted. Will repeat NST today and discuss with Dr. Ahumada need for any further inpatient testing versus outpatient testing.     Problem: Type 1 diabetes mellitus with ketoacidosis  Recommendation: -A1c 10.4%, poorly controlled diabetic. Patient previously on insulin infusion in CICU, placed on home regimen 20 units AM/8units PM long acting. Diabetic diet ordered, however cookies and soda at bedside. Encouarged adherence. Endocrinology consult appreciated, discussed management overnight, meal coverage insulin increased to 5 units. Nutrition education pending.   -BMP sent this AM. Will replace K+, Mag, Phos, as needed.   -Recent history of pyelonephritis which could have put her at risk for developing DKA. Urine and blood cultures penidng. PROBLEM DIAGNOSES  Problem: Asthma affecting pregnancy in second trimester  Recommendation: Last whheezing episode was approximately 5 months ago. Uses albuterol inhaler as needed.    Problem: Hypokalemia  Recommendation: Suggest potasium replacement PROBLEM DIAGNOSES  Problem: 27 weeks gestation of pregnancy  Recommendation: Patient had ultrasound on 3/18. BPP 8/8, ENRIQUE 18. EFW 1035g. NST overall category 1, however 1 deep variable deceleration noted. Will repeat NST today and discuss with Dr. Ahumada need for any further inpatient testing versus outpatient testing.     Problem: Type 1 diabetes mellitus with ketoacidosis  Recommendation: -A1c 10.4%, poorly controlled diabetic. Patient previously on insulin infusion in CICU, placed on home regimen 20 units AM/8units PM long acting. Diabetic diet ordered, however cookies and soda at bedside. Encouraged adherence. Endocrinology consult appreciated, discussed management overnight, meal coverage insulin increased to 5 units. Nutrition education pending.   -BMP sent this AM. Will replace K+, Mag, Phos, as needed.   -Recent history of pyelonephritis. Urine and blood cultures pending.   -Both DKA and pyelonephritis have been associated with poor pregnancy outcomes.       Problem: Asthma affecting pregnancy in second trimester  Recommendation: Currently asymptomatic. Last wheezing episode approximately 5 months ago. Uses albuterol as needed.    Problem: Hypokalemia  Recommendation: Recommend replacement, will discuss management with endocrinologist. PROBLEM DIAGNOSES  Problem: 27 weeks gestation of pregnancy  Recommendation: Patient had ultrasound on 3/18. BPP 8/8, ENRIQUE 18. EFW 1035g. NST overall category 1, however 1 deep variable deceleration noted. Will repeat NST today and discuss with Dr. Ahumada need for any further inpatient testing versus outpatient testing.     Problem: Type 1 diabetes mellitus with ketoacidosis  Recommendation: -A1c 10.4%, poorly controlled diabetic. Patient previously on insulin infusion in CICU, placed on home regimen 20 units AM/8units PM long acting. Diabetic diet ordered, however cookies and soda at bedside. Encouraged adherence. Endocrinology consult appreciated, discussed management overnight, meal coverage insulin increased to 5 units and adjusted sliding scale. Added a separate low sliding scale for bedtime. Nutrition education pending.   -BMP sent this AM. Will replace K+, Mag, Phos, as needed.   -Recent history of pyelonephritis. Urine and blood cultures pending.   -Both DKA and pyelonephritis have been associated with poor pregnancy outcomes.       Problem: Asthma affecting pregnancy in second trimester  Recommendation: Currently asymptomatic. Last wheezing episode approximately 5 months ago. Uses albuterol as needed.    Problem: Hypokalemia  Recommendation: Recommend replacement, will replace with KCl 40 mEQ PO. PROBLEM DIAGNOSES  Problem: General patient noncompliance  Recommendation: Social work consult appreciated, patient declines resource list at this time. Adherence to diabetic diet and insulin regimen encouarged.     Problem: 27 weeks gestation of pregnancy  Recommendation: Patient had ultrasound on 3/18. BPP 8/8, ENRIQUE 18. EFW 1035g. NST overall category 1, however 1 deep variable deceleration noted. Will repeat NST today and discuss with Dr. Ahumada need for any further inpatient testing versus outpatient testing.     Problem: Type 1 diabetes mellitus with ketoacidosis  Recommendation: -A1c 10.4%, poorly controlled diabetic. Patient previously on insulin infusion in CICU, placed on home regimen 20 units AM/8units PM long acting. Diabetic diet ordered, however cookies and soda at bedside. Encouraged adherence. Endocrinology consult appreciated, discussed management overnight, meal coverage insulin increased to 5 units and adjusted sliding scale. Added a separate low sliding scale for bedtime. Nutrition education pending.   -BMP sent this AM. Will replace K+, Mag, Phos, as needed.   -Recent history of pyelonephritis. Urine and blood cultures pending.   -Both DKA and pyelonephritis have been associated with poor pregnancy outcomes.       Problem: Asthma affecting pregnancy in second trimester  Recommendation: Currently asymptomatic. Last wheezing episode approximately 5 months ago. Uses albuterol as needed.    Problem: Hypokalemia  Recommendation: Recommend replacement, will replace with KCl 40 mEQ PO.

## 2019-03-19 NOTE — CONSULT NOTE ADULT - SUBJECTIVE AND OBJECTIVE BOX
A 19yo  EDC 2019 at Gestational Age 27 4/7 weeks with pregestational diabetes Class D since age 2yo. Does not recall LMP but knows that is how she is dated. She presented to ER with shortness of breath. She has a history of asthma and thought she was having an asthma attack so she called EMS. Had taken her home insulin prior to calling EMS and had fingersticks in the 450s. She had one episode of emesis in the ER. She was recently hospitalized 2-3 weeks ago for pyelonephritis and was treated with IV antibiotics for about 1 week. She was sent home on PO antibiotics and finished her course within the last 5 days. She sees Dr. Glenroy Yeh for routine OB care and sees an MFM but is unsure of the name. Insulin regimen managed by [facility] endocrinologist.     Interval history: Patient felt well overnight, no abdominal pain, vomiting. Tolerated diet. No contractions, leakage of fluid, vaginal bleeding. While she was getting her NST overnight, she was found to have irregular contractions versus uterine irritability. She was not feeling the contractions at that time. FFN was performed and negative and she was given a bolus of fluid. Accucheck was performed at that time and was elevated to 321 and she was covered 6 units. On AM rounds, patient complained of dizziness and requested for accucheck to be performed- it was 40 and she was treated with the hypoglycemic protocol, rechecked 18 minutes later and it was 209.     OB hx: SAB x2  GYN hx: Previously on birth control, borderline compliance- second pregnancy was while she was taking OCPs.   PMH:   -DM1 Class D: since 2yo, pt states she has been poorly controlled throughout childhood and sugars have been more tightly controlled during the pregnancy. Has been hospitalized around 10 times in her life for DKA, last time was at the very beginning of pregnancy. Insulin regimen is managed by an endocrinologist at [facility] but she does not recall the name.   -Asthma: PRN inhaler. Last attack was in the beginning of pregnancy. Has never required hospitalization or intubation.     PSH: thyroidectomy, adenoidectomy   Meds: dextrose 5% + lactated ringers. 1000 milliLiter(s) IV Continuous <Continuous>  enoxaparin Injectable 40 milliGRAM(s) SubCutaneous daily  insulin Infusion 2 Unit(s)/Hr IV Continuous <Continuous>  insulin NPH human recombinant 20 Unit(s) SubCutaneous before breakfast  insulin NPH human recombinant 8 Unit(s) SubCutaneous at bedtime  ondansetron Injectable 4 milliGRAM(s) IV Push once    Allergies: apple (Unknown)  No Known Drug Allergies  Pears (Unknown)    FAMILY HISTORY:  Grandmother and grandfather with DM2     Social History: Denies ETOH, smoking and drugs.     REVIEW OF SYSTEMS:  Constitutional: No weight loss, fever, chills, weakness or fatigue.  Cardiovascular: No chest pain, chest pressure or chest discomfort. No palpitations   Respiratory: No shortness of breath, cough or sputum.  Gastrointestinal: No anorexia, nausea, vomiting or diarrhea. No abdominal pain  Genitourinary: No pain or burning on urination.   Neurological: No headache, dizziness, syncope  Hematologic: No anemia, bleeding or bruising  Psychiatric: No history of depression or anxiety.  Endocrinologic: No reports of sweating, cold or heat intolerance.  Allergies: + asthma, no hives, eczema or rhinitis.    MEDICATIONS  (STANDING):  dextrose 5% + lactated ringers. 1000 milliLiter(s) (200 mL/Hr) IV Continuous <Continuous>  enoxaparin Injectable 40 milliGRAM(s) SubCutaneous daily  insulin Infusion 2 Unit(s)/Hr (2 mL/Hr) IV Continuous <Continuous>  insulin NPH human recombinant 20 Unit(s) SubCutaneous before breakfast  insulin NPH human recombinant 8 Unit(s) SubCutaneous at bedtime  ondansetron Injectable 4 milliGRAM(s) IV Push once        Fetal Monitoring:   Baseline 145, moderate variability, accelerations present 10x10, one deep variable present   [place]: uterine irritability intermittent irregular contractions     Vital Signs:  Vital Signs Last 24 Hrs  T(C): 36.4 (19 Mar 2019 04:10), Max: 37 (18 Mar 2019 12:00)  T(F): 97.6 (19 Mar 2019 04:10), Max: 98.6 (18 Mar 2019 12:00)  HR: 71 (19 Mar 2019 04:10) (63 - 95)  BP: 117/78 (19 Mar 2019 04:10) (117/78 - 129/84)  BP(mean): 95 (18 Mar 2019 12:00) (92 - 102)  RR: 18 (19 Mar 2019 04:10) (18 - 23)  SpO2: 99% (19 Mar 2019 04:10) (99% - 100%)    Physical Exam:  General: NAD  CVS: RRR  Lungs: CTA  Abdomen: +BS, soft, NT.  Pelvic: deferred   Ext: No cyanosis, edema or calf tenderness.     Labs:                        12.5   8.9   )-----------( 545      ( 18 Mar 2019 04:24 )             35.2   -18    135  |  106  |  13.0  ----------------------------<  150<H>  3.6   |  13.0<L>  |  0.63    Ca    9.5      18 Mar 2019 04:23  Phos  2.7       Mg     2.7         TPro  9.7<H>  /  Alb  4.4  /  TBili  <0.2<L>  /  DBili  x   /  AST  17  /  ALT  16  /  AlkPhos  131<H>      Hemoglobin A1C, Whole Blood (19 @ 12:13)    Hemoglobin A1C, Whole Blood: 10.4 %        Radiology   < from: US OB Fetus Limited (19 @ 04:17) >     EXAM:  US OB FETALBIOPHYS WO NONSTRES                         EXAM:  US OB LTD FETUS(ES)                          PROCEDURE DATE:  2019          INTERPRETATION:  Pelvic sonogram dated 3/18/2019.    CLINICAL INFORMATION: Pregnant female with DKA and respiratory distress,   assess fetal well-being. Approximately 27 weeks pregnant.    LMP: Unknown.    TECHNIQUE: Transabdominal pelvic sonogram is performed.    The study is correlated with a prior exam from 2019.    FINDINGS:    There is a single intrauterine gestational sac containing a live fetus.   Fetal cardiac activity is detected at 161 bpm. Fetal lie is cephalic. The   placenta is posterior. ENRIQUE sign 18.1 cm. BPP = 8/8. Cervical length is   within normal limits on transabdominal imaging measuring 3.5 cm.    Fetal biometry:    BPD = 6.7 cm corresponding to 27 weeks 0 days  HC = 24.8 cm corresponding to 27 weeks 0 days  AC = 22.9 cm corresponding to 27 weeks 2 days  FL = 5.0 cm corresponding to 27 weeks 0 days    Estimated gestational age by ultrasound: 27 weeks 1 day.    Estimated delivery date by ultrasound: 2019    Estimated fetal weight: 1035 g (2 lbs. 5 oz.).    IMPRESSION:    Single viable intrauterine gestation, as described. BPP = 8/8.                LOVLEEN ASYA D.O.; ATTENDING RADIOLOGIST  This document has been electronically signed. Mar 18 2019  4:28AM        < end of copied text > A 19yo  EDC 2019 at Gestational Age 27 4/7 weeks with pregestational diabetes Class D since age 4yo. Does not recall LMP but knows that is how she is dated. She presented to ER with shortness of breath. She has a history of asthma and thought she was having an asthma attack so she called EMS. Had taken her home insulin prior to calling EMS and had fingersticks in the 450s. She had one episode of emesis in the ER. She was recently hospitalized 2-3 weeks ago for pyelonephritis and was treated with IV antibiotics for about 1 week. She was sent home on PO antibiotics and finished her course within the last 5 days. She sees Dr. Glenroy Yeh for routine OB care and sees an MFM but is unsure of the name. Insulin regimen managed by [facility] endocrinologist.     Interval history: Patient felt well overnight, no abdominal pain, vomiting. Tolerated diet. No contractions, leakage of fluid, vaginal bleeding. While she was getting her NST overnight, she was found to have irregular contractions versus uterine irritability. She was not feeling the contractions at that time. FFN was performed and negative and she was given a bolus of fluid. Her dinner postprandial accucheck was 321 and she was covered 6 units. While getting NST, accucheck was performed and was 208, covered 4 units. On AM rounds, patient complained of dizziness and requested for accucheck to be performed- it was 40 and she was treated with the hypoglycemic protocol, rechecked 18 minutes later and it was 209.     OB hx: SAB x2  GYN hx: Previously on birth control, borderline compliance- second pregnancy was while she was taking OCPs.   PMH:   -DM1 Class D: since 4yo, pt states she has been poorly controlled throughout childhood and sugars have been more tightly controlled during the pregnancy. Has been hospitalized around 10 times in her life for DKA, last time was at the very beginning of pregnancy. Insulin regimen is managed by an endocrinologist at [facility] but she does not recall the name.   -Asthma: PRN inhaler. Last attack was in the beginning of pregnancy. Has never required hospitalization or intubation.     PSH: thyroidectomy, adenoidectomy   Meds: dextrose 5% + lactated ringers. 1000 milliLiter(s) IV Continuous <Continuous>  enoxaparin Injectable 40 milliGRAM(s) SubCutaneous daily  insulin Infusion 2 Unit(s)/Hr IV Continuous <Continuous>  insulin NPH human recombinant 20 Unit(s) SubCutaneous before breakfast  insulin NPH human recombinant 8 Unit(s) SubCutaneous at bedtime  ondansetron Injectable 4 milliGRAM(s) IV Push once    Allergies: apple (Unknown)  No Known Drug Allergies  Pears (Unknown)    FAMILY HISTORY:  Grandmother and grandfather with DM2     Social History: Denies ETOH, smoking and drugs.     REVIEW OF SYSTEMS:  Constitutional: No weight loss, fever, chills, weakness or fatigue.  Cardiovascular: No chest pain, chest pressure or chest discomfort. No palpitations   Respiratory: No shortness of breath, cough or sputum.  Gastrointestinal: No anorexia, nausea, vomiting or diarrhea. No abdominal pain  Genitourinary: No pain or burning on urination.   Neurological: No headache, dizziness, syncope  Hematologic: No anemia, bleeding or bruising  Psychiatric: No history of depression or anxiety.  Endocrinologic: No reports of sweating, cold or heat intolerance.  Allergies: + asthma, no hives, eczema or rhinitis.    MEDICATIONS  (STANDING):  dextrose 5% + lactated ringers. 1000 milliLiter(s) (200 mL/Hr) IV Continuous <Continuous>  enoxaparin Injectable 40 milliGRAM(s) SubCutaneous daily  insulin Infusion 2 Unit(s)/Hr (2 mL/Hr) IV Continuous <Continuous>  insulin NPH human recombinant 20 Unit(s) SubCutaneous before breakfast  insulin NPH human recombinant 8 Unit(s) SubCutaneous at bedtime  ondansetron Injectable 4 milliGRAM(s) IV Push once        Fetal Monitoring:   Baseline 145, moderate variability, accelerations present 10x10, one deep variable present   [place]: uterine irritability intermittent irregular contractions     Vital Signs:  Vital Signs Last 24 Hrs  T(C): 36.4 (19 Mar 2019 04:10), Max: 37 (18 Mar 2019 12:00)  T(F): 97.6 (19 Mar 2019 04:10), Max: 98.6 (18 Mar 2019 12:00)  HR: 71 (19 Mar 2019 04:10) (63 - 95)  BP: 117/78 (19 Mar 2019 04:10) (117/78 - 129/84)  BP(mean): 95 (18 Mar 2019 12:00) (92 - 102)  RR: 18 (19 Mar 2019 04:10) (18 - 23)  SpO2: 99% (19 Mar 2019 04:10) (99% - 100%)    Physical Exam:  General: NAD  CVS: RRR  Lungs: CTA  Abdomen: +BS, soft, NT.  Pelvic: deferred   Ext: No cyanosis, edema or calf tenderness.     Labs:                        12.5   8.9   )-----------( 545      ( 18 Mar 2019 04:24 )             35.2   03-18    135  |  106  |  13.0  ----------------------------<  150<H>  3.6   |  13.0<L>  |  0.63    Ca    9.5      18 Mar 2019 04:23  Phos  2.7     -  Mg     2.7     -    TPro  9.7<H>  /  Alb  4.4  /  TBili  <0.2<L>  /  DBili  x   /  AST  17  /  ALT  16  /  AlkPhos  131<H>      Hemoglobin A1C, Whole Blood (19 @ 12:13)    Hemoglobin A1C, Whole Blood: 10.4 %        Radiology   < from: US OB Fetus Limited (19 @ 04:17) >     EXAM:  US OB FETALBIOPHYS WO NONSTRES                         EXAM:  US OB LTD FETUS(ES)                          PROCEDURE DATE:  2019          INTERPRETATION:  Pelvic sonogram dated 3/18/2019.    CLINICAL INFORMATION: Pregnant female with DKA and respiratory distress,   assess fetal well-being. Approximately 27 weeks pregnant.    LMP: Unknown.    TECHNIQUE: Transabdominal pelvic sonogram is performed.    The study is correlated with a prior exam from 2019.    FINDINGS:    There is a single intrauterine gestational sac containing a live fetus.   Fetal cardiac activity is detected at 161 bpm. Fetal lie is cephalic. The   placenta is posterior. ENRIQUE sign 18.1 cm. BPP = 8/8. Cervical length is   within normal limits on transabdominal imaging measuring 3.5 cm.    Fetal biometry:    BPD = 6.7 cm corresponding to 27 weeks 0 days  HC = 24.8 cm corresponding to 27 weeks 0 days  AC = 22.9 cm corresponding to 27 weeks 2 days  FL = 5.0 cm corresponding to 27 weeks 0 days    Estimated gestational age by ultrasound: 27 weeks 1 day.    Estimated delivery date by ultrasound: 2019    Estimated fetal weight: 1035 g (2 lbs. 5 oz.).    IMPRESSION:    Single viable intrauterine gestation, as described. BPP = .                RADHA SKY D.O.; ATTENDING RADIOLOGIST  This document has been electronically signed. Mar 18 2019  4:28AM        < end of copied text > A 17yo  EDC 2019 at Gestational Age 27 4/7 weeks. She has pregestational diabetes Class D since age 2yo. Does not recall LMP but knows that is how she is dated. She presented to ER with shortness of breath. She has a history of asthma and thought she was having an asthma attack so she called EMS. Had taken her home insulin prior to calling EMS and had fingersticks in the 450s. She had one episode of emesis in the ER. She was recently hospitalized 2-3 weeks ago for pyelonephritis and was treated with IV antibiotics for about 1 week. She was sent home on PO antibiotics and finished her course within the last 5 days. She sees Dr. Glenroy Yeh for routine OB care and sees an MFM but is unsure of the name. Insulin regimen managed by [facility] endocrinologist.     Interval history: Patient felt well overnight, no abdominal pain, vomiting. Tolerated diet. No contractions, leakage of fluid, vaginal bleeding. While she was getting her NST overnight, she was found to have irregular contractions versus uterine irritability. She was not feeling the contractions at that time. FFN was performed and reported to be negative. She was given a bolus of IV fluids. Her dinner postprandial accucheck was 321 and she was covered with 6 units. While getting NST, accucheck was performed and was 208, covered 4 units. On AM rounds, patient complained of dizziness and requested for accucheck to be performed- it was 40 and she was treated with the hypoglycemic protocol, rechecked 18 minutes later and it was 209.     OB hx: SAB x2  GYN hx: Previously on birth control, borderline compliance- second pregnancy was while she was taking OCPs.   PMH:   -DM1 Class D: since 2yo, pt states she has been poorly controlled throughout childhood and sugars have been more tightly controlled during the pregnancy. Has been hospitalized around 10 times in her life for DKA, last time was at the very beginning of pregnancy. Insulin regimen is managed by an endocrinologist at [facility] but she does not recall the name.   -Asthma: PRN inhaler. Last attack was in the beginning of pregnancy. Has never required hospitalization or intubation.     PSH: thyroidectomy, adenoidectomy   Meds: dextrose 5% + lactated ringers. 1000 milliLiter(s) IV Continuous <Continuous>  enoxaparin Injectable 40 milliGRAM(s) SubCutaneous daily  insulin Infusion 2 Unit(s)/Hr IV Continuous <Continuous>  insulin NPH human recombinant 20 Unit(s) SubCutaneous before breakfast  insulin NPH human recombinant 8 Unit(s) SubCutaneous at bedtime  ondansetron Injectable 4 milliGRAM(s) IV Push once    Allergies: apple (Unknown)  No Known Drug Allergies  Pears (Unknown)    FAMILY HISTORY:  Grandmother and grandfather with DM2     Social History: Denies ETOH, smoking and drugs.     REVIEW OF SYSTEMS:  Constitutional: No weight loss, fever, chills, weakness or fatigue.  Cardiovascular: No chest pain, chest pressure or chest discomfort. No palpitations   Respiratory: No shortness of breath, cough or sputum.  Gastrointestinal: No anorexia, nausea, vomiting or diarrhea. No abdominal pain  Genitourinary: No pain or burning on urination.   Neurological: No headache, dizziness, syncope  Hematologic: No anemia, bleeding or bruising  Psychiatric: No history of depression or anxiety.  Endocrinologic: No reports of sweating, cold or heat intolerance.  Allergies: + asthma, no hives, eczema or rhinitis.    MEDICATIONS  (STANDING):  dextrose 5% + lactated ringers. 1000 milliLiter(s) (200 mL/Hr) IV Continuous <Continuous>  enoxaparin Injectable 40 milliGRAM(s) SubCutaneous daily  insulin Infusion 2 Unit(s)/Hr (2 mL/Hr) IV Continuous <Continuous>  insulin NPH human recombinant 20 Unit(s) SubCutaneous before breakfast  insulin NPH human recombinant 8 Unit(s) SubCutaneous at bedtime  ondansetron Injectable 4 milliGRAM(s) IV Push once    Fetal Monitoring:   Baseline 145, moderate variability, accelerations present 10x10, one deep variable present   [place]: uterine irritability intermittent irregular contractions     Vital Signs:  Vital Signs Last 24 Hrs  T(C): 36.4 (19 Mar 2019 04:10), Max: 37 (18 Mar 2019 12:00)  T(F): 97.6 (19 Mar 2019 04:10), Max: 98.6 (18 Mar 2019 12:00)  HR: 71 (19 Mar 2019 04:10) (63 - 95)  BP: 117/78 (19 Mar 2019 04:10) (117/78 - 129/84)  BP(mean): 95 (18 Mar 2019 12:00) (92 - 102)  RR: 18 (19 Mar 2019 04:10) (18 - 23)  SpO2: 99% (19 Mar 2019 04:10) (99% - 100%)    Physical Exam:  General: NAD  CVS: RRR  Lungs: CTA  Abdomen: +BS, soft, NT.  Pelvic: deferred   Ext: No cyanosis, edema or calf tenderness.     Labs:                        12.5   8.9   )-----------( 545      ( 18 Mar 2019 04:24 )             35.2   03-18    135  |  106  |  13.0  ----------------------------<  150<H>  3.6   |  13.0<L>  |  0.63    Ca    9.5      18 Mar 2019 04:23  Phos  2.7       Mg     2.7         TPro  9.7<H>  /  Alb  4.4  /  TBili  <0.2<L>  /  DBili  x   /  AST  17  /  ALT  16  /  AlkPhos  131<H>      Hemoglobin A1C, Whole Blood (19 @ 12:13)    Hemoglobin A1C, Whole Blood: 10.4 %    Radiology   < from: US OB Fetus Limited (19 @ 04:17) >   EXAM:  US OB FETALBIOPHYS WO NONSTRES                         EXAM:  US OB LTD FETUS(ES)                        PROCEDURE DATE:  2019    INTERPRETATION:  Pelvic sonogram dated 3/18/2019.  CLINICAL INFORMATION: Pregnant female with DKA and respiratory distress,   assess fetal well-being. Approximately 27 weeks pregnant.  LMP: Unknown.  TECHNIQUE: Transabdominal pelvic sonogram is performed.  The study is correlated with a prior exam from 2019.  FINDINGS:  There is a single intrauterine gestational sac containing a live fetus.   Fetal cardiac activity is detected at 161 bpm. Fetal lie is cephalic. The   placenta is posterior. ENRIQUE sign 18.1 cm. BPP = 8/8. Cervical length is   within normal limits on transabdominal imaging measuring 3.5 cm.  Fetal biometry:  BPD = 6.7 cm corresponding to 27 weeks 0 days  HC = 24.8 cm corresponding to 27 weeks 0 days  AC = 22.9 cm corresponding to 27 weeks 2 days  FL = 5.0 cm corresponding to 27 weeks 0 days  Estimated gestational age by ultrasound: 27 weeks 1 day.  Estimated delivery date by ultrasound: 2019  Estimated fetal weight: 1035 g (2 lbs. 5 oz.).  IMPRESSION:  Single viable intrauterine gestation, as described. BPP = .  RADHA SKY D.O.; ATTENDING RADIOLOGIST  This document has been electronically signed. Mar 18 2019  4:28AM  < end of copied text >

## 2019-03-19 NOTE — CHART NOTE - NSCHARTNOTEFT_GEN_A_CORE
Pt had hypoglycemic episode w/ fingerstick of 40.  Pt alert and oriented x3.  Denies diaphoresis or palpitations.   Attempted to give pt the Glucose 15g gel but patient did not like the taste.  Given applejuice and Dextrose 50% 25g injectable.   Repeat FS was Pt had hypoglycemic episode w/ fingerstick of 40.  Pt alert and oriented x3.  Denies diaphoresis or palpitations.   Attempted to give pt the Glucose 15g gel but patient did not like the taste.  Given applejuice and Dextrose 50% 25g injectable.   Repeat FS was 209.  Will hold off with giving correctional insulin until her next FS for breakfast in 1-2hrs.     D/W Dr. Leon

## 2019-03-20 ENCOUNTER — TRANSCRIPTION ENCOUNTER (OUTPATIENT)
Age: 19
End: 2019-03-20

## 2019-03-20 VITALS — SYSTOLIC BLOOD PRESSURE: 125 MMHG | DIASTOLIC BLOOD PRESSURE: 85 MMHG | HEART RATE: 93 BPM

## 2019-03-20 DIAGNOSIS — O36.5120 MATERNAL CARE FOR KNOWN OR SUSPECTED PLACENTAL INSUFFICIENCY, SECOND TRIMESTER, NOT APPLICABLE OR UNSPECIFIED: ICD-10-CM

## 2019-03-20 DIAGNOSIS — O24.012 PRE-EXISTING TYPE 1 DIABETES MELLITUS, IN PREGNANCY, SECOND TRIMESTER: ICD-10-CM

## 2019-03-20 DIAGNOSIS — E10.10 TYPE 1 DIABETES MELLITUS WITH KETOACIDOSIS WITHOUT COMA: ICD-10-CM

## 2019-03-20 LAB
ANION GAP SERPL CALC-SCNC: 14 MMOL/L — SIGNIFICANT CHANGE UP (ref 5–17)
BUN SERPL-MCNC: 11 MG/DL — SIGNIFICANT CHANGE UP (ref 8–20)
CALCIUM SERPL-MCNC: 8.6 MG/DL — SIGNIFICANT CHANGE UP (ref 8.6–10.2)
CHLORIDE SERPL-SCNC: 105 MMOL/L — SIGNIFICANT CHANGE UP (ref 98–107)
CO2 SERPL-SCNC: 19 MMOL/L — LOW (ref 22–29)
CREAT SERPL-MCNC: 0.51 MG/DL — SIGNIFICANT CHANGE UP (ref 0.5–1.3)
GLUCOSE BLDC GLUCOMTR-MCNC: 158 MG/DL — HIGH (ref 70–99)
GLUCOSE BLDC GLUCOMTR-MCNC: 163 MG/DL — HIGH (ref 70–99)
GLUCOSE BLDC GLUCOMTR-MCNC: 170 MG/DL — HIGH (ref 70–99)
GLUCOSE BLDC GLUCOMTR-MCNC: 213 MG/DL — HIGH (ref 70–99)
GLUCOSE BLDC GLUCOMTR-MCNC: 231 MG/DL — HIGH (ref 70–99)
GLUCOSE SERPL-MCNC: 149 MG/DL — HIGH (ref 70–115)
POTASSIUM SERPL-MCNC: 3.6 MMOL/L — SIGNIFICANT CHANGE UP (ref 3.5–5.3)
POTASSIUM SERPL-SCNC: 3.6 MMOL/L — SIGNIFICANT CHANGE UP (ref 3.5–5.3)
SODIUM SERPL-SCNC: 138 MMOL/L — SIGNIFICANT CHANGE UP (ref 135–145)

## 2019-03-20 PROCEDURE — 76820 UMBILICAL ARTERY ECHO: CPT | Mod: 26

## 2019-03-20 PROCEDURE — 96374 THER/PROPH/DIAG INJ IV PUSH: CPT

## 2019-03-20 PROCEDURE — 76805 OB US >/= 14 WKS SNGL FETUS: CPT

## 2019-03-20 PROCEDURE — 93976 VASCULAR STUDY: CPT | Mod: 26

## 2019-03-20 PROCEDURE — 85027 COMPLETE CBC AUTOMATED: CPT

## 2019-03-20 PROCEDURE — 82010 KETONE BODYS QUAN: CPT

## 2019-03-20 PROCEDURE — 76815 OB US LIMITED FETUS(S): CPT

## 2019-03-20 PROCEDURE — 76815 OB US LIMITED FETUS(S): CPT | Mod: 26,59

## 2019-03-20 PROCEDURE — 87800 DETECT AGNT MULT DNA DIREC: CPT

## 2019-03-20 PROCEDURE — 93975 VASCULAR STUDY: CPT

## 2019-03-20 PROCEDURE — 76819 FETAL BIOPHYS PROFIL W/O NST: CPT

## 2019-03-20 PROCEDURE — 83036 HEMOGLOBIN GLYCOSYLATED A1C: CPT

## 2019-03-20 PROCEDURE — 87086 URINE CULTURE/COLONY COUNT: CPT

## 2019-03-20 PROCEDURE — 80048 BASIC METABOLIC PNL TOTAL CA: CPT

## 2019-03-20 PROCEDURE — 96375 TX/PRO/DX INJ NEW DRUG ADDON: CPT

## 2019-03-20 PROCEDURE — 82962 GLUCOSE BLOOD TEST: CPT

## 2019-03-20 PROCEDURE — 71046 X-RAY EXAM CHEST 2 VIEWS: CPT

## 2019-03-20 PROCEDURE — 99285 EMERGENCY DEPT VISIT HI MDM: CPT | Mod: 25

## 2019-03-20 PROCEDURE — 85014 HEMATOCRIT: CPT

## 2019-03-20 PROCEDURE — 84100 ASSAY OF PHOSPHORUS: CPT

## 2019-03-20 PROCEDURE — 96361 HYDRATE IV INFUSION ADD-ON: CPT

## 2019-03-20 PROCEDURE — 99232 SBSQ HOSP IP/OBS MODERATE 35: CPT | Mod: GC

## 2019-03-20 PROCEDURE — 81001 URINALYSIS AUTO W/SCOPE: CPT

## 2019-03-20 PROCEDURE — 80053 COMPREHEN METABOLIC PANEL: CPT

## 2019-03-20 PROCEDURE — 76821 MIDDLE CEREBRAL ARTERY ECHO: CPT | Mod: 26

## 2019-03-20 PROCEDURE — 36415 COLL VENOUS BLD VENIPUNCTURE: CPT

## 2019-03-20 PROCEDURE — 83735 ASSAY OF MAGNESIUM: CPT

## 2019-03-20 PROCEDURE — 87040 BLOOD CULTURE FOR BACTERIA: CPT

## 2019-03-20 PROCEDURE — 76819 FETAL BIOPHYS PROFIL W/O NST: CPT | Mod: 26

## 2019-03-20 PROCEDURE — 82731 ASSAY OF FETAL FIBRONECTIN: CPT

## 2019-03-20 RX ORDER — HUMAN INSULIN 100 [IU]/ML
0 INJECTION, SUSPENSION SUBCUTANEOUS
Qty: 0 | Refills: 0 | COMMUNITY

## 2019-03-20 RX ORDER — INSULIN LISPRO 100/ML
0 VIAL (ML) SUBCUTANEOUS
Qty: 0 | Refills: 0 | COMMUNITY

## 2019-03-20 RX ADMIN — Medication 3: at 13:49

## 2019-03-20 RX ADMIN — Medication 5: at 09:48

## 2019-03-20 RX ADMIN — Medication 5 UNIT(S): at 08:45

## 2019-03-20 RX ADMIN — HUMAN INSULIN 20 UNIT(S): 100 INJECTION, SUSPENSION SUBCUTANEOUS at 08:06

## 2019-03-20 RX ADMIN — Medication 5 UNIT(S): at 12:45

## 2019-03-20 NOTE — PROGRESS NOTE ADULT - PROBLEM SELECTOR PROBLEM 2
Placental insufficiency affecting management of mother in second trimester, single or unspecified fetus

## 2019-03-20 NOTE — DISCHARGE NOTE ANTEPARTUM - HOSPITAL COURSE
A 19 yo  EDC 2019 by LMP at Gestational Age 27 5/7 weeks. Admitted with DKA. She has mild bronchial asthma. During her hospital stay she also developed hypokalemia.  Pt is being discharged home in reassuring stable condition, DKA resolved, Hypokalemia treated, Pt should follow up with endocrinologist and MFM specialist for further management of the pregnancy and follow up of her pending bloodwork results

## 2019-03-20 NOTE — DISCHARGE NOTE ANTEPARTUM - CARE PLAN
Principal Discharge DX:	27 weeks gestation of pregnancy  Goal:	successful carriage of pregnancy to term  Assessment and plan of treatment:	1. Pt having daily NST during hospitalization. No evidence of fetal compromise   2. Pt is in improving/good condition and may be discharge home with follow up within 1 week by OB provider, MFM provider and endocrinologist.  Secondary Diagnosis:	Asthma affecting pregnancy in second trimester  Assessment and plan of treatment:	currently asymptomatic  Secondary Diagnosis:	Pre-existing type 1 diabetes mellitus during pregnancy in second trimester  Assessment and plan of treatment:	Poor glycemic control during pregnancy course. Hemoglobin A1C elevated on admission. Treated with aggressive insulin regiment. Glycemic control improving during hospitalization. Pt will be followed up by high risk MD and Endocrinologist   as an outpatient.  Secondary Diagnosis:	Placental insufficiency affecting management of mother in second trimester, single or unspecified fetus  Assessment and plan of treatment:	1. Sonogram shows elevated umbilical artery S/D above the 90% for gestational age. Cerebroplacental ratio (CPR) was low at < 2.5%. L Uterine pulsatility index was elevated with notching present in the velocity waveform. Advised to have weekly or twice weekly fetal testing.  Secondary Diagnosis:	Hypokalemia  Assessment and plan of treatment:	Treated with potassium replacement. Awaiting repeat Potassium level.  Secondary Diagnosis:	Diabetic ketoacidosis without coma associated with type 1 diabetes mellitus  Assessment and plan of treatment:	Admitted with diabetic ketoacidosis, which was has resolved.  Secondary Diagnosis:	General patient noncompliance  Assessment and plan of treatment:	Seen by . Advised to comply with recommended diet and insulin regiment. Was also advised to comply with prenatal visits to high risk OB MD and Endocrinologist. Principal Discharge DX:	Diabetic ketoacidosis without coma associated with type 1 diabetes mellitus  Goal:	successful carriage of pregnancy to term  Assessment and plan of treatment:	Admitted with diabetic ketoacidosis, which was has resolved.  Secondary Diagnosis:	Asthma affecting pregnancy in second trimester  Assessment and plan of treatment:	currently asymptomatic  Secondary Diagnosis:	Pre-existing type 1 diabetes mellitus during pregnancy in second trimester  Assessment and plan of treatment:	Poor glycemic control during pregnancy course. Hemoglobin A1C elevated on admission. Treated with aggressive insulin regiment. Glycemic control improving during hospitalization. Pt will be followed up by high risk MD and Endocrinologist   as an outpatient.  Secondary Diagnosis:	Placental insufficiency affecting management of mother in second trimester, single or unspecified fetus  Assessment and plan of treatment:	1. Sonogram shows elevated umbilical artery S/D above the 90% for gestational age. Cerebroplacental ratio (CPR) was low at < 2.5%. L Uterine pulsatility index was elevated with notching present in the velocity waveform. Advised to have weekly or twice weekly fetal testing.  Secondary Diagnosis:	Hypokalemia  Assessment and plan of treatment:	Treated with potassium replacement. Awaiting repeat Potassium level.  Secondary Diagnosis:	General patient noncompliance  Assessment and plan of treatment:	Seen by . Advised to comply with recommended diet and insulin regiment. Was also advised to comply with prenatal visits to high risk OB MD and Endocrinologist.  Secondary Diagnosis:	27 weeks gestation of pregnancy  Assessment and plan of treatment:	1. Pt having daily NST during hospitalization. No evidence of fetal compromise   2. Pt is in improving/good condition and may be discharge home with follow up within 1 week by OB provider, MFM provider and endocrinologist.

## 2019-03-20 NOTE — PROGRESS NOTE ADULT - PROBLEM SELECTOR PLAN 5
1. Seen by . Advised to comply with recommended diet and insulin regiment. Was also advised to comply with prenatal visits to high risk OB MD and Endocrinologist. 1. Seen by . Advised to comply with recommended diet and insulin regiment. Was also advised to comply with prenatal visits to high risk OB MD and Endocrinologist

## 2019-03-20 NOTE — PROGRESS NOTE ADULT - PROBLEM SELECTOR PLAN 1
1. Pt having daily NST during hospitalization. No evidence of fetal compromise. 1. Pt having daily NST during hospitalization. No evidence of fetal compromise   2. Pt is in improving/good condition and may be discharge home with follow up within 1 week by OB provider, MFM provider and endocrinologist

## 2019-03-20 NOTE — DISCHARGE NOTE ANTEPARTUM - PLAN OF CARE
successful carriage of pregnancy to term 1. Pt having daily NST during hospitalization. No evidence of fetal compromise   2. Pt is in improving/good condition and may be discharge home with follow up within 1 week by OB provider, MFM provider and endocrinologist. currently asymptomatic Poor glycemic control during pregnancy course. Hemoglobin A1C elevated on admission. Treated with aggressive insulin regiment. Glycemic control improving during hospitalization. Pt will be followed up by high risk MD and Endocrinologist   as an outpatient. 1. Sonogram shows elevated umbilical artery S/D above the 90% for gestational age. Cerebroplacental ratio (CPR) was low at < 2.5%. L Uterine pulsatility index was elevated with notching present in the velocity waveform. Advised to have weekly or twice weekly fetal testing. Treated with potassium replacement. Awaiting repeat Potassium level. Admitted with diabetic ketoacidosis, which was has resolved. Seen by . Advised to comply with recommended diet and insulin regiment. Was also advised to comply with prenatal visits to high risk OB MD and Endocrinologist.

## 2019-03-20 NOTE — PROGRESS NOTE ADULT - PROBLEM SELECTOR PLAN 2
1. Sonogram shows elevated umbilical artery S/D above the 90% for gestational age. Cerebroplacental ratio (CPR) was low at < 2.5%. L Uterine pulsatility index was elevated with notching present in the velocity waveform. 1. Sonogram shows elevated umbilical artery S/D above the 90% for gestational age. Cerebroplacental ratio (CPR) was low at < 2.5%. L Uterine pulsatility index was elevated with notching present in the velocity waveform. Advised to have weekly or twice weekly fetal testing

## 2019-03-20 NOTE — DISCHARGE NOTE ANTEPARTUM - PRINCIPAL DIAGNOSIS
27 weeks gestation of pregnancy Diabetic ketoacidosis without coma associated with type 1 diabetes mellitus

## 2019-03-20 NOTE — DISCHARGE NOTE ANTEPARTUM - SECONDARY DIAGNOSIS.
Asthma affecting pregnancy in second trimester Pre-existing type 1 diabetes mellitus during pregnancy in second trimester Placental insufficiency affecting management of mother in second trimester, single or unspecified fetus Hypokalemia Diabetic ketoacidosis without coma associated with type 1 diabetes mellitus General patient noncompliance 27 weeks gestation of pregnancy

## 2019-03-20 NOTE — DISCHARGE NOTE ANTEPARTUM - PATIENT PORTAL LINK FT
You can access the [facility], offered by [facility], by registering with the following website: [email or web address]

## 2019-03-20 NOTE — PROGRESS NOTE ADULT - ASSESSMENT
A 17 yo  EDC 2019 by LMP at Gestational Age 27 5/7 weeks. Admitted with DKA. She has mild bronchial asthma. A 19 yo  EDC 2019 by LMP at Gestational Age 27 5/7 weeks. Admitted with DKA. She has mild bronchial asthma. She also developed hypokalemia.

## 2019-03-20 NOTE — PROGRESS NOTE ADULT - SUBJECTIVE AND OBJECTIVE BOX
A 17yo  EDC 2019 at Gestational Age 27 5/7 weeks. She has pregestational diabetes Class D since age 2yo. Does not recall LMP but knows that is how she is dated. She presented to ER with shortness of breath. She has a history of asthma and thought she was having an asthma attack so she called EMS. Had taken her home insulin prior to calling EMS and had fingersticks in the 450s. She had one episode of emesis in the ER. She was recently hospitalized 2-3 weeks ago for pyelonephritis and was treated with IV antibiotics for about 1 week. She was sent home on PO antibiotics and finished her course within the last 5 days. She sees Dr. Glenroy Yeh for routine OB care and sees an MFM but is unsure of the name. Insulin regimen managed by [facility] endocrinologist.     Interval history: Patient felt well overnight, no abdominal pain, vomiting. Tolerated diet. No contractions, leakage of fluid, vaginal bleeding. While she was getting her NST overnight, she was found to have irregular contractions versus uterine irritability. She was not feeling the contractions at that time. FFN was performed and reported to be negative. She was given a bolus of IV fluids. Her dinner postprandial accucheck was 188 and she was covered with 4 units. On AM rounds, patient desires to go home. No hypoglycemic episodes over last 24 hours. Pt glycemic control improving, but most glucose readings are still elevated and requires insulin coverage.     OB hx: SAB x2  GYN hx: Previously on birth control, borderline compliance- second pregnancy was while she was taking OCPs.   PMH:   -DM1 Class D: since 2yo, pt states she has been poorly controlled throughout childhood and sugars have been more tightly controlled during the pregnancy. Has been hospitalized around 10 times in her life for DKA, last time was at the very beginning of pregnancy. Insulin regimen is managed by an endocrinologist at [facility] but she does not recall the name.   -Asthma: PRN inhaler. Last attack was in the beginning of pregnancy. Has never required hospitalization or intubation.     PSH: thyroidectomy, adenoidectomy   Meds: dextrose 5% + lactated ringers. 1000 milliLiter(s) IV Continuous <Continuous>  enoxaparin Injectable 40 milliGRAM(s) SubCutaneous daily  insulin Infusion 2 Unit(s)/Hr IV Continuous <Continuous>  insulin NPH human recombinant 20 Unit(s) SubCutaneous before breakfast  insulin NPH human recombinant 8 Unit(s) SubCutaneous at bedtime  ondansetron Injectable 4 milliGRAM(s) IV Push once    Allergies: apple (Unknown)  No Known Drug Allergies  Pears (Unknown)    FAMILY HISTORY:  Grandmother and grandfather with DM2     Social History: Denies ETOH, smoking and drugs.     REVIEW OF SYSTEMS:  Constitutional: No weight loss, fever, chills, weakness or fatigue.  Cardiovascular: No chest pain, chest pressure or chest discomfort. No palpitations   Respiratory: No shortness of breath, cough or sputum.  Gastrointestinal: No anorexia, nausea, vomiting or diarrhea. No abdominal pain  Genitourinary: No pain or burning on urination.   Neurological: No headache, dizziness, syncope  Hematologic: No anemia, bleeding or bruising  Psychiatric: No history of depression or anxiety.  Endocrinologic: No reports of sweating, cold or heat intolerance.  Allergies: + asthma, no hives, eczema or rhinitis.    MEDICATIONS  (STANDING):  dextrose 5% + lactated ringers. 1000 milliLiter(s) (200 mL/Hr) IV Continuous <Continuous>  enoxaparin Injectable 40 milliGRAM(s) SubCutaneous daily  insulin Infusion 2 Unit(s)/Hr (2 mL/Hr) IV Continuous <Continuous>  insulin NPH human recombinant 20 Unit(s) SubCutaneous before breakfast  insulin NPH human recombinant 8 Unit(s) SubCutaneous at bedtime  ondansetron Injectable 4 milliGRAM(s) IV Push once    Fetal Monitoring:   Baseline 145, moderate variability, accelerations present 10x10, one deep variable present   [place]: uterine irritability intermittent irregular contractions     Vital Signs:  Vital Signs Last 24 Hrs  T(C): 36.4 (19 Mar 2019 04:10), Max: 37 (18 Mar 2019 12:00)  T(F): 97.6 (19 Mar 2019 04:10), Max: 98.6 (18 Mar 2019 12:00)  HR: 71 (19 Mar 2019 04:10) (63 - 95)  BP: 117/78 (19 Mar 2019 04:10) (117/78 - 129/84)  BP(mean): 95 (18 Mar 2019 12:00) (92 - 102)  RR: 18 (19 Mar 2019 04:10) (18 - 23)  SpO2: 99% (19 Mar 2019 04:10) (99% - 100%)    Physical Exam:  General: NAD  CVS: RRR  Lungs: CTA  Abdomen: +BS, soft, NT.  Pelvic: deferred   Ext: No cyanosis, edema or calf tenderness.     Labs:                        12.5   8.9   )-----------( 545      ( 18 Mar 2019 04:24 )             35.2   -18    135  |  106  |  13.0  ----------------------------<  150<H>  3.6   |  13.0<L>  |  0.63    Ca    9.5      18 Mar 2019 04:23  Phos  2.7       Mg     2.7         TPro  9.7<H>  /  Alb  4.4  /  TBili  <0.2<L>  /  DBili  x   /  AST  17  /  ALT  16  /  AlkPhos  131<H>      Hemoglobin A1C, Whole Blood (19 @ 12:13)    Hemoglobin A1C, Whole Blood: 10.4 %    Radiology   < from: US OB Fetus Limited (19 @ 04:17) >   EXAM:  US OB FETALBIOPHYS WO NONSTRES                         EXAM:  US OB LTD FETUS(ES)                        PROCEDURE DATE:  2019    INTERPRETATION:  Pelvic sonogram dated 3/18/2019.  CLINICAL INFORMATION: Pregnant female with DKA and respiratory distress,   assess fetal well-being. Approximately 27 weeks pregnant.  LMP: Unknown.  TECHNIQUE: Transabdominal pelvic sonogram is performed.  The study is correlated with a prior exam from 2019.  FINDINGS:  There is a single intrauterine gestational sac containing a live fetus.   Fetal cardiac activity is detected at 161 bpm. Fetal lie is cephalic. The   placenta is posterior. ENRIQUE sign 18.1 cm. BPP = 8/8. Cervical length is   within normal limits on transabdominal imaging measuring 3.5 cm.  Fetal biometry:  BPD = 6.7 cm corresponding to 27 weeks 0 days  HC = 24.8 cm corresponding to 27 weeks 0 days  AC = 22.9 cm corresponding to 27 weeks 2 days  FL = 5.0 cm corresponding to 27 weeks 0 days  Estimated gestational age by ultrasound: 27 weeks 1 day.  Estimated delivery date by ultrasound: 2019  Estimated fetal weight: 1035 g (2 lbs. 5 oz.).  IMPRESSION:  Single viable intrauterine gestation, as described. BPP = 8/8.  RADHA SKY D.O.; ATTENDING RADIOLOGIST  This document has been electronically signed. Mar 18 2019  4:28AM  < end of copied text > A 19yo  EDC 2019 at Gestational Age 27 5/7 weeks. She has pregestational diabetes Class D since age 2yo. Does not recall LMP but knows that is how she is dated. She presented to ER with shortness of breath. She has a history of asthma and thought she was having an asthma attack so she called EMS. Had taken her home insulin prior to calling EMS and had fingersticks in the 450s. She had one episode of emesis in the ER. She was recently hospitalized 2-3 weeks ago for pyelonephritis and was treated with IV antibiotics for about 1 week. She was sent home on PO antibiotics and finished her course within the last 5 days. She sees Dr. Glenroy Yeh for routine OB care and sees an MFM affiliated with [facility]. Insulin regimen managed by [facility] endocrinologist.     Interval history: Patient felt well overnight, no abdominal pain, vomiting. Tolerated diet. No contractions, leakage of fluid, vaginal bleeding. While she was getting her NST overnight, she was found to have irregular contractions versus uterine irritability. She was not feeling the contractions at that time. FFN was performed and reported to be negative. She was given a bolus of IV fluids. Her dinner postprandial accucheck was 188 and she was covered with 4 units. On AM rounds, patient desires to go home. No hypoglycemic episodes over last 24 hours. Pt glycemic control improving, but most glucose readings are still elevated and requires insulin coverage.     OB hx: SAB x2  GYN hx: Previously on birth control, borderline compliance- second pregnancy was while she was taking OCPs.   PMH:   -DM1 Class D: since 2yo, pt states she has been poorly controlled throughout childhood and sugars have been more tightly controlled during the pregnancy. Has been hospitalized around 10 times in her life for DKA, last time was at the very beginning of pregnancy. Insulin regimen is managed by an endocrinologist at [facility] but she does not recall the name.   -Asthma: PRN inhaler. Last attack was in the beginning of pregnancy. Has never required hospitalization or intubation.     PSH: thyroidectomy, adenoidectomy   Meds: dextrose 5% + lactated ringers. 1000 milliLiter(s) IV Continuous <Continuous>  enoxaparin Injectable 40 milliGRAM(s) SubCutaneous daily  insulin Infusion 2 Unit(s)/Hr IV Continuous <Continuous>  insulin NPH human recombinant 20 Unit(s) SubCutaneous before breakfast  insulin NPH human recombinant 8 Unit(s) SubCutaneous at bedtime  ondansetron Injectable 4 milliGRAM(s) IV Push once    Allergies: apple (Unknown)  No Known Drug Allergies  Pears (Unknown)    FAMILY HISTORY:  Grandmother and grandfather with DM2     Social History: Denies ETOH, smoking and drugs.     REVIEW OF SYSTEMS:  Constitutional: No weight loss, fever, chills, weakness or fatigue.  Cardiovascular: No chest pain, chest pressure or chest discomfort. No palpitations   Respiratory: No shortness of breath, cough or sputum.  Gastrointestinal: No anorexia, nausea, vomiting or diarrhea. No abdominal pain  Genitourinary: No pain or burning on urination.   Neurological: No headache, dizziness, syncope  Hematologic: No anemia, bleeding or bruising  Psychiatric: No history of depression or anxiety.  Endocrinologic: No reports of sweating, cold or heat intolerance.  Allergies: + asthma, no hives, eczema or rhinitis.    MEDICATIONS  (STANDING):  dextrose 5% + lactated ringers. 1000 milliLiter(s) (200 mL/Hr) IV Continuous <Continuous>  enoxaparin Injectable 40 milliGRAM(s) SubCutaneous daily  insulin Infusion 2 Unit(s)/Hr (2 mL/Hr) IV Continuous <Continuous>  insulin NPH human recombinant 20 Unit(s) SubCutaneous before breakfast  insulin NPH human recombinant 8 Unit(s) SubCutaneous at bedtime  ondansetron Injectable 4 milliGRAM(s) IV Push once    Fetal Monitoring:   Baseline 145, moderate variability, accelerations present 10x10, one deep variable present   [place]: uterine irritability intermittent irregular contractions     Vital Signs:  Vital Signs Last 24 Hrs  T(C): 36.4 (19 Mar 2019 04:10), Max: 37 (18 Mar 2019 12:00)  T(F): 97.6 (19 Mar 2019 04:10), Max: 98.6 (18 Mar 2019 12:00)  HR: 71 (19 Mar 2019 04:10) (63 - 95)  BP: 117/78 (19 Mar 2019 04:10) (117/78 - 129/84)  BP(mean): 95 (18 Mar 2019 12:00) (92 - 102)  RR: 18 (19 Mar 2019 04:10) (18 - 23)  SpO2: 99% (19 Mar 2019 04:10) (99% - 100%)    Physical Exam:  General: NAD  CVS: RRR  Lungs: CTA  Abdomen: +BS, soft, NT.  Pelvic: deferred   Ext: No cyanosis, edema or calf tenderness.     Labs:                        12.5   8.9   )-----------( 545      ( 18 Mar 2019 04:24 )             35.2   -18    135  |  106  |  13.0  ----------------------------<  150<H>  3.6   |  13.0<L>  |  0.63    Ca    9.5      18 Mar 2019 04:23  Phos  2.7       Mg     2.7         TPro  9.7<H>  /  Alb  4.4  /  TBili  <0.2<L>  /  DBili  x   /  AST  17  /  ALT  16  /  AlkPhos  131<H>      Hemoglobin A1C, Whole Blood (19 @ 12:13)    Hemoglobin A1C, Whole Blood: 10.4 %    Radiology   < from: US OB Fetus Limited (19 @ 04:17) >   EXAM:  US OB FETALBIOPHYS WO NONSTRES                         EXAM:  US OB LTD FETUS(ES)                        PROCEDURE DATE:  2019    INTERPRETATION:  Pelvic sonogram dated 3/18/2019.  CLINICAL INFORMATION: Pregnant female with DKA and respiratory distress,   assess fetal well-being. Approximately 27 weeks pregnant.  LMP: Unknown.  TECHNIQUE: Transabdominal pelvic sonogram is performed.  The study is correlated with a prior exam from 2019.  FINDINGS:  There is a single intrauterine gestational sac containing a live fetus.   Fetal cardiac activity is detected at 161 bpm. Fetal lie is cephalic. The   placenta is posterior. ENRIQUE sign 18.1 cm. BPP = 8/8. Cervical length is   within normal limits on transabdominal imaging measuring 3.5 cm.  Fetal biometry:  BPD = 6.7 cm corresponding to 27 weeks 0 days  HC = 24.8 cm corresponding to 27 weeks 0 days  AC = 22.9 cm corresponding to 27 weeks 2 days  FL = 5.0 cm corresponding to 27 weeks 0 days  Estimated gestational age by ultrasound: 27 weeks 1 day.  Estimated delivery date by ultrasound: 2019  Estimated fetal weight: 1035 g (2 lbs. 5 oz.).  IMPRESSION:  Single viable intrauterine gestation, as described. BPP = 8/8.  RADHA SKY D.O.; ATTENDING RADIOLOGIST  This document has been electronically signed. Mar 18 2019  4:28AM  < end of copied text > A 17yo  EDC 2019 at Gestational Age 27 5/7 weeks. She has pregestational diabetes Class D since age 4yo. Does not recall LMP but knows that is how she is dated. She presented to ER with shortness of breath. She has a history of asthma and thought she was having an asthma attack so she called EMS. Had taken her home insulin prior to calling EMS and had fingersticks in the 450s. She had one episode of emesis in the ER. She was recently hospitalized 2-3 weeks ago for pyelonephritis and was treated with IV antibiotics for about 1 week. She was sent home on PO antibiotics and finished her course within the last 5 days. She sees Dr. Glenroy Yeh for routine OB care and sees an MFM affiliated with [facility]. Insulin regimen managed by [facility] endocrinologist.     Interval history: Patient felt well overnight, no abdominal pain, vomiting. Tolerated diet. No contractions, leakage of fluid, vaginal bleeding. While she was getting her NST overnight, she was found to have irregular contractions versus uterine irritability. She was not feeling the contractions at that time. FFN was performed and reported to be negative. She was given a bolus of IV fluids. Her dinner postprandial accucheck was 188 and she was covered with 4 units. On AM rounds, patient desires to go home. No hypoglycemic episodes over last 24 hours. Pt glycemic control improving, but most glucose readings are still elevated and requires insulin coverage.     OB hx: SAB x2  GYN hx: Previously on birth control, borderline compliance- second pregnancy was while she was taking OCPs.   PMH:   -DM1 Class D: since 4yo, pt states she has been poorly controlled throughout childhood and sugars have been more tightly controlled during the pregnancy. Has been hospitalized around 10 times in her life for DKA, last time was at the very beginning of pregnancy. Insulin regimen is managed by an endocrinologist at [facility] but she does not recall the name.   -Asthma: PRN inhaler. Last attack was in the beginning of pregnancy. Has never required hospitalization or intubation.     PSH: thyroidectomy, adenoidectomy   Meds: dextrose 5% + lactated ringers. 1000 milliLiter(s) IV Continuous <Continuous>  enoxaparin Injectable 40 milliGRAM(s) SubCutaneous daily  insulin Infusion 2 Unit(s)/Hr IV Continuous <Continuous>  insulin NPH human recombinant 20 Unit(s) SubCutaneous before breakfast  insulin NPH human recombinant 8 Unit(s) SubCutaneous at bedtime  ondansetron Injectable 4 milliGRAM(s) IV Push once    Allergies: apple (Unknown)  No Known Drug Allergies  Pears (Unknown)    FAMILY HISTORY:  Grandmother and grandfather with DM2     Social History: Denies ETOH, smoking and drugs.     REVIEW OF SYSTEMS:  Constitutional: No weight loss, fever, chills, weakness or fatigue.  Cardiovascular: No chest pain, chest pressure or chest discomfort. No palpitations   Respiratory: No shortness of breath, cough or sputum.  Gastrointestinal: No anorexia, nausea, vomiting or diarrhea. No abdominal pain  Genitourinary: No pain or burning on urination.   Neurological: No headache, dizziness, syncope  Hematologic: No anemia, bleeding or bruising  Psychiatric: No history of depression or anxiety.  Endocrinologic: No reports of sweating, cold or heat intolerance.  Allergies: + asthma, no hives, eczema or rhinitis.    MEDICATIONS  (STANDING):  dextrose 5% + lactated ringers. 1000 milliLiter(s) (200 mL/Hr) IV Continuous <Continuous>  enoxaparin Injectable 40 milliGRAM(s) SubCutaneous daily  insulin Infusion 2 Unit(s)/Hr (2 mL/Hr) IV Continuous <Continuous>  insulin NPH human recombinant 20 Unit(s) SubCutaneous before breakfast  insulin NPH human recombinant 8 Unit(s) SubCutaneous at bedtime  ondansetron Injectable 4 milliGRAM(s) IV Push once    Vital Signs:  Vital Signs Last 24 Hrs  T(C): 36.4 (19 Mar 2019 04:10), Max: 37 (18 Mar 2019 12:00)  T(F): 97.6 (19 Mar 2019 04:10), Max: 98.6 (18 Mar 2019 12:00)  HR: 71 (19 Mar 2019 04:10) (63 - 95)  BP: 117/78 (19 Mar 2019 04:10) (117/78 - 129/84)  BP(mean): 95 (18 Mar 2019 12:00) (92 - 102)  RR: 18 (19 Mar 2019 04:10) (18 - 23)  SpO2: 99% (19 Mar 2019 04:10) (99% - 100%)    Physical Exam:  General: NAD  CVS: RRR  Lungs: CTA  Abdomen: +BS, soft, NT.  Pelvic: deferred   Ext: No cyanosis, edema or calf tenderness.     Labs:                        12.5   8.9   )-----------( 545      ( 18 Mar 2019 04:24 )             35.2   -    135  |  106  |  13.0  ----------------------------<  150<H>  3.6   |  13.0<L>  |  0.63    Ca    9.5      18 Mar 2019 04:23  Phos  2.7       Mg     2.7         TPro  9.7<H>  /  Alb  4.4  /  TBili  <0.2<L>  /  DBili  x   /  AST  17  /  ALT  16  /  AlkPhos  131<H>      Hemoglobin A1C, Whole Blood (19 @ 12:13)    Hemoglobin A1C, Whole Blood: 10.4 %    Radiology   < from: US OB Fetus Limited (19 @ 04:17) >   EXAM:  US OB FETALBIOPHYS WO NONSTRES                         EXAM:  US OB LTD FETUS(ES)                        PROCEDURE DATE:  2019    INTERPRETATION:  Pelvic sonogram dated 3/18/2019.  CLINICAL INFORMATION: Pregnant female with DKA and respiratory distress,   assess fetal well-being. Approximately 27 weeks pregnant.  LMP: Unknown.  TECHNIQUE: Transabdominal pelvic sonogram is performed.  The study is correlated with a prior exam from 2019.  FINDINGS:  There is a single intrauterine gestational sac containing a live fetus.   Fetal cardiac activity is detected at 161 bpm. Fetal lie is cephalic. The   placenta is posterior. ENRIQUE sign 18.1 cm. BPP = 8/8. Cervical length is   within normal limits on transabdominal imaging measuring 3.5 cm.  Fetal biometry:  BPD = 6.7 cm corresponding to 27 weeks 0 days  HC = 24.8 cm corresponding to 27 weeks 0 days  AC = 22.9 cm corresponding to 27 weeks 2 days  FL = 5.0 cm corresponding to 27 weeks 0 days  Estimated gestational age by ultrasound: 27 weeks 1 day.  Estimated delivery date by ultrasound: 2019  Estimated fetal weight: 1035 g (2 lbs. 5 oz.).  IMPRESSION:  Single viable intrauterine gestation, as described. BPP = 8/8.  RADHA SKY D.O.; ATTENDING RADIOLOGIST  This document has been electronically signed. Mar 18 2019  4:28AM  < end of copied text >

## 2019-03-20 NOTE — PROGRESS NOTE ADULT - PROBLEM SELECTOR PLAN 3
1. Poor glycemic control during pregnancy course. Hemoglobin A1C elevated on admission. Treated with aggressive insulin regiment. Glycemic control improving during hospitalization. Pt will be followed up by high risk MD and Endocrinologist as an outpatient. 1. Poor glycemic control during pregnancy course. Hemoglobin A1C elevated on admission. Treated with aggressive insulin regiment. Glycemic control improving during hospitalization. Pt will be followed up by high risk MD and Endocrinologist   as an outpatient.

## 2019-03-20 NOTE — DISCHARGE NOTE ANTEPARTUM - CARE PROVIDER_API CALL
Jhonatan Ahumada)  MaternalFetal Medicine; Obstetrics and Gynecology  [address] [place] [address], [facility] [address]  [place] [address]  Phone: [phone]  Fax: [phone]  Follow Up Time:

## 2019-03-21 LAB
CANDIDA AB TITR SER: DETECTED
G VAGINALIS DNA SPEC QL NAA+PROBE: SIGNIFICANT CHANGE UP
T VAGINALIS SPEC QL WET PREP: SIGNIFICANT CHANGE UP

## 2019-03-23 LAB
CULTURE RESULTS: SIGNIFICANT CHANGE UP
SPECIMEN SOURCE: SIGNIFICANT CHANGE UP

## 2019-07-10 PROCEDURE — 82330 ASSAY OF CALCIUM: CPT

## 2019-07-10 PROCEDURE — 96374 THER/PROPH/DIAG INJ IV PUSH: CPT

## 2019-07-10 PROCEDURE — 84443 ASSAY THYROID STIM HORMONE: CPT

## 2019-07-10 PROCEDURE — 82947 ASSAY GLUCOSE BLOOD QUANT: CPT

## 2019-07-10 PROCEDURE — 83605 ASSAY OF LACTIC ACID: CPT

## 2019-07-10 PROCEDURE — 96375 TX/PRO/DX INJ NEW DRUG ADDON: CPT

## 2019-07-10 PROCEDURE — 84702 CHORIONIC GONADOTROPIN TEST: CPT

## 2019-07-10 PROCEDURE — 82803 BLOOD GASES ANY COMBINATION: CPT

## 2019-07-10 PROCEDURE — 80048 BASIC METABOLIC PNL TOTAL CA: CPT

## 2019-07-10 PROCEDURE — 85027 COMPLETE CBC AUTOMATED: CPT

## 2019-07-10 PROCEDURE — 81001 URINALYSIS AUTO W/SCOPE: CPT

## 2019-07-10 PROCEDURE — 85014 HEMATOCRIT: CPT

## 2019-07-10 PROCEDURE — 80053 COMPREHEN METABOLIC PANEL: CPT

## 2019-07-10 PROCEDURE — 99285 EMERGENCY DEPT VISIT HI MDM: CPT | Mod: 25

## 2019-07-10 PROCEDURE — 83735 ASSAY OF MAGNESIUM: CPT

## 2019-07-10 PROCEDURE — 84295 ASSAY OF SERUM SODIUM: CPT

## 2019-07-10 PROCEDURE — 82435 ASSAY OF BLOOD CHLORIDE: CPT

## 2019-07-10 PROCEDURE — 36415 COLL VENOUS BLD VENIPUNCTURE: CPT

## 2019-07-10 PROCEDURE — 84132 ASSAY OF SERUM POTASSIUM: CPT

## 2019-07-10 PROCEDURE — 87086 URINE CULTURE/COLONY COUNT: CPT

## 2019-07-10 PROCEDURE — 84100 ASSAY OF PHOSPHORUS: CPT

## 2019-07-10 PROCEDURE — 83036 HEMOGLOBIN GLYCOSYLATED A1C: CPT

## 2019-07-10 PROCEDURE — 82962 GLUCOSE BLOOD TEST: CPT

## 2019-08-08 ENCOUNTER — TRANSCRIPTION ENCOUNTER (OUTPATIENT)
Age: 19
End: 2019-08-08

## 2019-08-30 NOTE — ED PROVIDER NOTE - CPE EDP NEURO NORM
Assessment/Plan


Status:  stable, progressing


Assessment/Plan:


1. Staghorn renal calculus.


2. Bladder calculus.


3. Mild hydronephrosis, likely chronic.


4. Chronic kidney disease.


5. Acute kidney injury.


6. Hematuria.


7. Pyuria, poss UTI.


8. Urinary retention.


9. Neurogenic bladder.


10. Bladder diverticulum.





monitor clinically


pierre out and voiding


monitor renal fxn, labile


s/p abx


flomax BID resumed


cont proscar


pt with very significant and complex stone burden


will likely need multiple complicated urologic procedures with close follow up


best course of action would be higher lever of care at tertiary care facility





Subjective


Allergies:  


Coded Allergies:  


     No Known Allergies (Unverified , 7/23/19)


Subjective


all noted, voiding, looks comfortable





Objective





Last 24 Hour Vital Signs








  Date Time  Temp Pulse Resp B/P (MAP) Pulse Ox O2 Delivery O2 Flow Rate FiO2


 


8/30/19 04:00 98.2 57 16 128/68 (88) 96   


 


8/30/19 00:00 98.4 66 18 149/75 (99) 96   


 


8/29/19 20:34      Room Air  


 


8/29/19 20:00 98.4 67 20 140/69 (92) 97   


 


8/29/19 15:54 98.9 86 17 119/79 (92) 98   


 


8/29/19 12:00 98.9 80 18 118/72 (87) 99   


 


8/29/19 09:00      Room Air  

















Intake and Output  


 


 8/29/19 8/30/19





 18:59 06:59


 


Intake Total 1000 ml 240 ml


 


Balance 1000 ml 240 ml


 


  


 


Intake Oral  240 ml


 


Other 1000 ml 


 


# Voids  3











Microbiology








 Date/Time


Source Procedure


Growth Status


 


 


 7/23/19 14:50


Nasal Nares MRSA Culture - Final


Staphylococcus Aureus - Mrsa Complete


 


 7/25/19 04:45


Stool Clostridium difficile Toxin Assay - Final Complete


 


 8/8/19 23:00


Urine,Clean Catch Urine Culture - Final


NO GROWTH AFTER 48 HOURS Complete


 


 7/23/19 14:50


Rectum VRE Culture - Final


NO VANCOMYCIN RESISTANT ENTEROCOCCUS ... Complete








Current Medications








 Medications


  (Trade)  Dose


 Ordered  Sig/Lul


 Route


 PRN Reason  Start Time


 Stop Time Status Last Admin


Dose Admin


 


 Cholestyramine


 Resin


  (Questran)  4 gm  TID


 ORAL


   8/29/19 09:00


 9/28/19 08:59  8/29/19 17:05


 


 


 Escitalopram


 Oxalate


  (Lexapro)  10 mg  DAILY


 ORAL


   8/2/19 09:00


 9/1/19 08:59  8/29/19 08:06


 


 


 Finasteride


  (Proscar)  5 mg  DAILY


 ORAL


   8/12/19 09:00


 9/11/19 08:59  8/29/19 08:06


 


 


 Loperamide HCl


  (Imodium)  2 mg  Q4H  PRN


 ORAL


 Diarrhea  8/28/19 19:00


 9/27/19 18:59  8/29/19 17:05


 


 


 Risperidone


  (RisperDAL)  2 mg  BEDTIME


 ORAL


   8/9/19 21:00


 9/2/19 20:59  8/29/19 20:37


 


 


 Tamsulosin HCl


  (Flomax)  0.4 mg  BID


 ORAL


   8/24/19 18:00


 9/23/19 17:59  8/29/19 17:05


 


 


 Thiamine HCl


  (Vitamin B1)  100 mg  DAILY


 ORAL


   8/2/19 09:00


 9/1/19 08:59  8/29/19 08:06


 








Height (Feet):  5


Height (Inches):  10.00


Weight (Pounds):  182


Objective


 exam stable





last renal u/s negative for hydro











Israel Pink MD Aug 30, 2019 07:50 normal...

## 2019-09-19 NOTE — DISCHARGE NOTE ADULT - FUNCTIONAL SCREEN CURRENT LEVEL: AMBULATION, MLM
phone: Not on file     Gets together: Not on file     Attends Confucianist service: Not on file     Active member of club or organization: Not on file     Attends meetings of clubs or organizations: Not on file     Relationship status: Not on file    Intimate partner violence:     Fear of current or ex partner: Not on file     Emotionally abused: Not on file     Physically abused: Not on file     Forced sexual activity: Not on file   Other Topics Concern    Not on file   Social History Narrative    Not on file       Prior to Admission medications    Medication Sig Start Date End Date Taking? Authorizing Provider   cyclobenzaprine (FLEXERIL) 10 MG tablet Take 1 tablet by mouth 3 times daily as needed for Muscle spasms Best one hour before bedtime and then Ice your lower back for 20 min 9/17/19 9/27/19 Yes Paulina Huang MD   methylPREDNISolone (MEDROL DOSEPACK) 4 MG tablet Take by mouth.  9/17/19 9/23/19 Yes Paulina Huang MD   metoprolol succinate (TOPROL XL) 50 MG extended release tablet TAKE ONE TABLET BY MOUTH DAILY 9/9/19  Yes Paulina Huang MD   apixaban Stephie Fortis) 5 MG TABS tablet Take 1 tablet by mouth 2 times daily 8/14/19  Yes GLADYS Coleman CNP   atorvastatin (LIPITOR) 20 MG tablet Take 1 tablet by mouth daily 7/12/19  Yes Paulina Huang MD   traZODone (DESYREL) 50 MG tablet Take 1 tablet by mouth nightly 5/6/19  Yes Paulina Huang MD   hydrochlorothiazide (MICROZIDE) 12.5 MG capsule TAKE ONE CAPSULE BY MOUTH DAILY 10/22/18  Yes Paulina Huang MD   Naproxen Sodium (ALEVE PO) Take by mouth as needed   Yes Historical Provider, MD   Cholecalciferol (VITAMIN D) 2000 units CAPS capsule Take by mouth   Yes Historical Provider, MD   Omega-3 Fatty Acids (FISH OIL PO) Take by mouth   Yes Historical Provider, MD   Multiple Vitamins-Minerals (THERAPEUTIC MULTIVITAMIN-MINERALS) tablet Take 1 tablet by mouth daily   Yes Historical Provider, MD       Allergies as of 09/19/2019 - Review Complete
0 = independent

## 2019-10-14 NOTE — ED PROVIDER NOTE - DATE/TIME 1
----- Message from Hannah Cooney sent at 10/14/2019  2:50 PM EDT -----  Spoke to patient, she states she will call back.    Thank you   ----- Message -----  From: Alvaro Guzman MA  Sent: 10/14/2019  12:42 PM  To: Hannah Cooney    She needs an appointment please.     Thank you       ----- Message -----  From: Hannah Cooney  Sent: 10/14/2019  10:56 AM  To: Alvaro Guzman MA    REFILL ON:    ALPRAZolam (XANAX) 0.5 MG tablet QTY: 90  Sig: Take 1 tablet by mouth 3 (Three) Times a Day As Needed for Anxiety.    #PT ADVISED SHE MAY NEED TO BE SEEN FIRST FOR REFILL#    PLEASE CALL WHEN READY: [phone]    THANK YOU       
14-Jun-2018 18:09

## 2019-11-04 NOTE — ED PROVIDER NOTE - TEMPLATE, MLM
Patient:   BRIAN SALINAS            MRN: [id number]            FIN: [id number]              Age:   68 years     Sex:  MALE     :  49   Associated Diagnoses:   None   Author:   GRECIA SALGADO     SUBJECTIVE  Feels better  Denies abdominal pain ,nausea or vomiting   No dysuria   No cough or SOB   No HA or dysuria   ROS:   A 10 points review of system was done and is negative except for the above  OBJECTIVE  I & O between:  2019 12:15 TO 2019 12:15  Med Dosing Weight:  93.18  kg   2019  24 Hour Intake:   900.00  ( 9.66 mL/kg )  24 Hour Output:   1000.00           24 Hour Urine/Stool Output:   0.0  24 Hour Balance:   -100.00           24 Hour Urine Output:   1000.00  ( 0.45 mL/kg/hr )                    Stool Count:  1     Vitals between:   2019 12:15:54   TO   2019 12:15:54                   LAST RESULT MINIMUM MAXIMUM  Temperature 36.6 36.5 36.9  Heart Rate 98 77 98  Respiratory Rate 16 16 16  NISBP           120 119 122  NIDBP           69 66 75  NIMBP           86 84 91  SpO2                    97 97 99  Medications (24) Active  Scheduled: (12)  *Antibiotic Dosing Communication  1 each, N/A, Daily  Acetaminophen 500 mg tab  1,000 mg 2 tab, Oral, Q8H  ApixaBAN 2.5 mg tab  2.5 mg 1 tab, Oral, Q12H  bupivacaine MPF 0.75% 150 mg + ketorolac 30 mg + morphine 4 mg + EPINEPHrine PF 1 mg/mL 0.3 mg  150 mg 20 mL, IntraJoint, On Call - Send to OR  Celecoxib 200 mg cap  200 mg 1 cap, Oral, Q12H  Chlorhexidine gluconate 0.12% ORAL RINSE 15 mL repack  15 mL, Oral Mucosa, Q12H  Gabapentin 300 mg cap  300 mg 1 cap, Oral, Q8H  Insulin human lispro 1 unit/0.01 mL inj  1-6 units, Subcutaneous, QID [with meals & HS]  Senna-docusate sodium 8.6-50 mg tab  2 tab, Oral, BID  Sodium hypochlorite 0.125% ( str) irrigation 473 mL  1 application, Topical, Once (scheduled)  vancomycin  2,000 mg, IVPB, Once (scheduled)  vancomycin  1,500 mg, IVPB, Q12H (variable)  Continuous: (1)  Sodium Chloride  0.9% 1,000 mL  1,000 mL, IV, 80 mL/hr  PRN: (11)  Dextrose (glucose) 40% 15 gm/37.5 gm oral gel UD  15 gm, Oral, As Directed PRN  Dextrose (glucose) 50% 25 gm/50 mL syringe  12.5 gm 25 mL, IV Push, As Directed PRN  Glucagon 1 mg/1 mL emergency kit SDV  1 mg 1 mL, IM, As Directed PRN  HYDROmorphone PF 1 mg/1 mL inj SDV  0.3 mg 0.3 mL, IV Push, Q2H  Milk of magnesia 8% 30 mL oral susp UD  30 mL, Oral, Daily  Ondansetron 4 mg disintegrating tab  4 mg 1 tab, Oral, Q6H  Ondansetron 4 mg/2 mL inj SDV  4 mg 2 mL, Slow IV Push, Q6H  OxyCODONE 5 mg IR tab  5 mg 1 tab, Oral, Q4H  OxyCODONE 5 mg IR tab  10 mg 2 tab, Oral, Q4H  TraMADol 50 mg tab  25 mg 0.5 tab, Oral, Q4H  TraMADol 50 mg tab  50 mg 1 tab, Oral, Q4H  PHYSICAL EXAM   General:  Awake , alert, no acute distress.    Skin:  Warm, no rash   Head:  Normocephalic, atraumatic.    Neck:  Supple   Eye: anicteric sclera  well injected  conjunctiva,   Ears, nose, mouth and throat:  Oral mucosa moist, no pharyngeal erythema or exudate.   No oral thrush   Cardiovascular:  Regular rate and rhythm, No murmur.    Respiratory:  Lungs are clear to auscultation, respirations are non-labored.   Chest wall:  No tenderness, No deformity.     Gastrointestinal:  Soft, Nontender, Non distended.    Extermities : no edema no cyanosis   Neurological:  Alert and oriented to person, place, time, and situation, normal motor observed, normal speech observed.   Psychiatric:  Cooperative, appropriate mood & affect.  Labs:  Labs between:  03-NOV-2019 12:15 to 04-NOV-2019 12:15  CBC:                 WBC  HgB  Hct  Plt  MCV  RDW   04-NOV-2019 6.9  (L) 7.4  (L) 21.7  216  85.4  14.6   DIFF:                 Seg  Neutroph//ABS  Lymph//ABS  Mono//ABS  EOS/ABS  04-NOV-2019 NOT APPLICABLE  67 // 4.6 15 // 1.1 11 // 0.8 6 // 0.4  BMP:                 Na  Cl  BUN  Glu   04-NOV-2019 138  104  15  (H) 160                              K  CO2  Cr  Ca                              3.6  27  0.80  (L) 8.3   POC GLU:                  Latest Result  Latest Date  Minimum  Min Date  Maximum  Max Date                             (H) 168  04-NOV-2019 (H) 168  04-NOV-2019 (H) 162  03-NOV-2019  Drug Levels:                           Vancomycin                   Trough: 15.8                              MICRO  Reviewed  RADIOLOGY   Reviewed   ASSESSMENT  -   -  -  PLAN  -Cefazolin 2gm iv q 8 hrs for 6 weeks  Weekly cbc cmp esr crp  Fax labs to   Fu with me in office in 4 weeks   -  -   Fluid/Electrolyte/Metabolic

## 2019-12-09 NOTE — H&P ADULT - NEGATIVE GASTROINTESTINAL SYMPTOMS
Nuclear medicine ([place] [facility]) called office, stating that diagnosis on PET scan order needs further clarification.    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   no steatorrhea/no abdominal pain/no diarrhea

## 2019-12-24 NOTE — PATIENT PROFILE ADULT - OVER THE PAST TWO WEEKS HAVE YOU FELT DOWN, DEPRESSED OR HOPELESS?
HPI:     Chief Complaint     Full Skin Exam        HPI     Full Skin Exam      Additional comments: LOV 11/27/2018 Patient present for full body skin exam .Patient has hx of dyplastic nevus           Last edited by Carlos Giraldo on 12/24/2019 10:07 localized, unspecified site      History reviewed. No pertinent surgical history.   Social History    Socioeconomic History      Marital status:       Spouse name: Not on file      Number of children: Not on file      Years of education: Not on file abdomen, r upper extremity, l upper extremity, buttocks, genital area, l lower extremity and right lower extremity. Also exam of scalp  The patient is alert, oriented, and appears their stated age.   Patient is well nourished and in no distress    The exam including hip, unspecified laterality    No orders of the defined types were placed in this encounter. Results From Past 48 Hours:  No results found for this or any previous visit (from the past 48 hour(s)).     Meds This Visit:      Imaging Orders:  N no

## 2019-12-28 ENCOUNTER — INPATIENT (INPATIENT)
Facility: HOSPITAL | Age: 19
LOS: 0 days | Discharge: HOME | DRG: 638 | End: 2019-12-29
Attending: HOSPITALIST | Admitting: INTERNAL MEDICINE
Payer: MEDICAID

## 2019-12-28 VITALS
HEART RATE: 142 BPM | RESPIRATION RATE: 18 BRPM | SYSTOLIC BLOOD PRESSURE: 130 MMHG | OXYGEN SATURATION: 97 % | DIASTOLIC BLOOD PRESSURE: 87 MMHG

## 2019-12-28 DIAGNOSIS — Z98.890 OTHER SPECIFIED POSTPROCEDURAL STATES: Chronic | ICD-10-CM

## 2019-12-28 DIAGNOSIS — E10.10 TYPE 1 DIABETES MELLITUS WITH KETOACIDOSIS WITHOUT COMA: ICD-10-CM

## 2019-12-28 DIAGNOSIS — E86.0 DEHYDRATION: ICD-10-CM

## 2019-12-28 LAB
ALBUMIN SERPL ELPH-MCNC: 4 G/DL — SIGNIFICANT CHANGE UP (ref 3.3–5.2)
ALP SERPL-CCNC: 142 U/L — HIGH (ref 40–120)
ALT FLD-CCNC: 18 U/L — SIGNIFICANT CHANGE UP
ANION GAP SERPL CALC-SCNC: 13 MMOL/L — SIGNIFICANT CHANGE UP (ref 5–17)
ANION GAP SERPL CALC-SCNC: 18 MMOL/L — HIGH (ref 5–17)
ANION GAP SERPL CALC-SCNC: 28 MMOL/L — HIGH (ref 5–17)
APPEARANCE UR: ABNORMAL
AST SERPL-CCNC: 35 U/L — HIGH
B-OH-BUTYR SERPL-SCNC: 6.4 MMOL/L — HIGH
BASOPHILS # BLD AUTO: 0 K/UL — SIGNIFICANT CHANGE UP (ref 0–0.2)
BASOPHILS NFR BLD AUTO: 0 % — SIGNIFICANT CHANGE UP (ref 0–2)
BILIRUB SERPL-MCNC: <0.2 MG/DL — LOW (ref 0.4–2)
BILIRUB UR-MCNC: NEGATIVE — SIGNIFICANT CHANGE UP
BUN SERPL-MCNC: 10 MG/DL — SIGNIFICANT CHANGE UP (ref 8–20)
BUN SERPL-MCNC: 12 MG/DL — SIGNIFICANT CHANGE UP (ref 8–20)
BUN SERPL-MCNC: 23 MG/DL — HIGH (ref 8–20)
CALCIUM SERPL-MCNC: 6.9 MG/DL — LOW (ref 8.6–10.2)
CALCIUM SERPL-MCNC: 7.3 MG/DL — LOW (ref 8.6–10.2)
CALCIUM SERPL-MCNC: 7.7 MG/DL — LOW (ref 8.6–10.2)
CHLORIDE SERPL-SCNC: 100 MMOL/L — SIGNIFICANT CHANGE UP (ref 98–107)
CHLORIDE SERPL-SCNC: 110 MMOL/L — HIGH (ref 98–107)
CHLORIDE SERPL-SCNC: 114 MMOL/L — HIGH (ref 98–107)
CO2 SERPL-SCNC: 11 MMOL/L — LOW (ref 22–29)
CO2 SERPL-SCNC: 13 MMOL/L — LOW (ref 22–29)
CO2 SERPL-SCNC: 6 MMOL/L — CRITICAL LOW (ref 22–29)
COLOR SPEC: YELLOW — SIGNIFICANT CHANGE UP
CREAT SERPL-MCNC: 0.59 MG/DL — SIGNIFICANT CHANGE UP (ref 0.5–1.3)
CREAT SERPL-MCNC: 0.68 MG/DL — SIGNIFICANT CHANGE UP (ref 0.5–1.3)
CREAT SERPL-MCNC: 1.1 MG/DL — SIGNIFICANT CHANGE UP (ref 0.5–1.3)
DIFF PNL FLD: ABNORMAL
EOSINOPHIL # BLD AUTO: 0 K/UL — SIGNIFICANT CHANGE UP (ref 0–0.5)
EOSINOPHIL NFR BLD AUTO: 0 % — SIGNIFICANT CHANGE UP (ref 0–6)
GAS PNL BLDV: SIGNIFICANT CHANGE UP
GLUCOSE BLDC GLUCOMTR-MCNC: 102 MG/DL — HIGH (ref 70–99)
GLUCOSE BLDC GLUCOMTR-MCNC: 118 MG/DL — HIGH (ref 70–99)
GLUCOSE BLDC GLUCOMTR-MCNC: 75 MG/DL — SIGNIFICANT CHANGE UP (ref 70–99)
GLUCOSE BLDC GLUCOMTR-MCNC: 77 MG/DL — SIGNIFICANT CHANGE UP (ref 70–99)
GLUCOSE BLDC GLUCOMTR-MCNC: 84 MG/DL — SIGNIFICANT CHANGE UP (ref 70–99)
GLUCOSE BLDC GLUCOMTR-MCNC: 88 MG/DL — SIGNIFICANT CHANGE UP (ref 70–99)
GLUCOSE BLDC GLUCOMTR-MCNC: 88 MG/DL — SIGNIFICANT CHANGE UP (ref 70–99)
GLUCOSE BLDC GLUCOMTR-MCNC: 91 MG/DL — SIGNIFICANT CHANGE UP (ref 70–99)
GLUCOSE BLDC GLUCOMTR-MCNC: 93 MG/DL — SIGNIFICANT CHANGE UP (ref 70–99)
GLUCOSE BLDC GLUCOMTR-MCNC: 93 MG/DL — SIGNIFICANT CHANGE UP (ref 70–99)
GLUCOSE SERPL-MCNC: 107 MG/DL — SIGNIFICANT CHANGE UP (ref 70–115)
GLUCOSE SERPL-MCNC: 372 MG/DL — HIGH (ref 70–115)
GLUCOSE SERPL-MCNC: 91 MG/DL — SIGNIFICANT CHANGE UP (ref 70–115)
GLUCOSE UR QL: 1000 MG/DL
HCG SERPL-ACNC: <4 MIU/ML — SIGNIFICANT CHANGE UP
HCT VFR BLD CALC: 36.2 % — SIGNIFICANT CHANGE UP (ref 34.5–45)
HCT VFR BLD CALC: 52.7 % — HIGH (ref 34.5–45)
HGB BLD-MCNC: 12 G/DL — SIGNIFICANT CHANGE UP (ref 11.5–15.5)
HGB BLD-MCNC: 16.4 G/DL — HIGH (ref 11.5–15.5)
KETONES UR-MCNC: ABNORMAL
LEUKOCYTE ESTERASE UR-ACNC: ABNORMAL
LYMPHOCYTES # BLD AUTO: 18.4 % — SIGNIFICANT CHANGE UP (ref 13–44)
LYMPHOCYTES # BLD AUTO: 4.8 K/UL — HIGH (ref 1–3.3)
MAGNESIUM SERPL-MCNC: 1.4 MG/DL — LOW (ref 1.6–2.6)
MAGNESIUM SERPL-MCNC: 1.5 MG/DL — LOW (ref 1.6–2.6)
MCHC RBC-ENTMCNC: 30.3 PG — SIGNIFICANT CHANGE UP (ref 27–34)
MCHC RBC-ENTMCNC: 30.4 PG — SIGNIFICANT CHANGE UP (ref 27–34)
MCHC RBC-ENTMCNC: 31.1 GM/DL — LOW (ref 32–36)
MCHC RBC-ENTMCNC: 33.1 GM/DL — SIGNIFICANT CHANGE UP (ref 32–36)
MCV RBC AUTO: 91.6 FL — SIGNIFICANT CHANGE UP (ref 80–100)
MCV RBC AUTO: 97.4 FL — SIGNIFICANT CHANGE UP (ref 80–100)
MONOCYTES # BLD AUTO: 1.38 K/UL — HIGH (ref 0–0.9)
MONOCYTES NFR BLD AUTO: 5.3 % — SIGNIFICANT CHANGE UP (ref 2–14)
NEUTROPHILS # BLD AUTO: 18.74 K/UL — HIGH (ref 1.8–7.4)
NEUTROPHILS NFR BLD AUTO: 70.2 % — SIGNIFICANT CHANGE UP (ref 43–77)
NITRITE UR-MCNC: NEGATIVE — SIGNIFICANT CHANGE UP
PH UR: 5 — SIGNIFICANT CHANGE UP (ref 5–8)
PHOSPHATE SERPL-MCNC: 2.1 MG/DL — LOW (ref 2.4–4.7)
PLATELET # BLD AUTO: 342 K/UL — SIGNIFICANT CHANGE UP (ref 150–400)
PLATELET # BLD AUTO: 510 K/UL — HIGH (ref 150–400)
POTASSIUM SERPL-MCNC: 3.3 MMOL/L — LOW (ref 3.5–5.3)
POTASSIUM SERPL-MCNC: 3.5 MMOL/L — SIGNIFICANT CHANGE UP (ref 3.5–5.3)
POTASSIUM SERPL-MCNC: 4.8 MMOL/L — SIGNIFICANT CHANGE UP (ref 3.5–5.3)
POTASSIUM SERPL-SCNC: 3.3 MMOL/L — LOW (ref 3.5–5.3)
POTASSIUM SERPL-SCNC: 3.5 MMOL/L — SIGNIFICANT CHANGE UP (ref 3.5–5.3)
POTASSIUM SERPL-SCNC: 4.8 MMOL/L — SIGNIFICANT CHANGE UP (ref 3.5–5.3)
PROT SERPL-MCNC: 7 G/DL — SIGNIFICANT CHANGE UP (ref 6.6–8.7)
PROT UR-MCNC: 30 MG/DL
RBC # BLD: 3.95 M/UL — SIGNIFICANT CHANGE UP (ref 3.8–5.2)
RBC # BLD: 5.41 M/UL — HIGH (ref 3.8–5.2)
RBC # FLD: 11.1 % — SIGNIFICANT CHANGE UP (ref 10.3–14.5)
RBC # FLD: 12.3 % — SIGNIFICANT CHANGE UP (ref 10.3–14.5)
SODIUM SERPL-SCNC: 134 MMOL/L — LOW (ref 135–145)
SODIUM SERPL-SCNC: 139 MMOL/L — SIGNIFICANT CHANGE UP (ref 135–145)
SODIUM SERPL-SCNC: 140 MMOL/L — SIGNIFICANT CHANGE UP (ref 135–145)
SP GR SPEC: 1.02 — SIGNIFICANT CHANGE UP (ref 1.01–1.02)
UROBILINOGEN FLD QL: NEGATIVE MG/DL — SIGNIFICANT CHANGE UP
WBC # BLD: 13.4 K/UL — HIGH (ref 3.8–10.5)
WBC # BLD: 26.06 K/UL — HIGH (ref 3.8–10.5)
WBC # FLD AUTO: 13.4 K/UL — HIGH (ref 3.8–10.5)
WBC # FLD AUTO: 26.06 K/UL — HIGH (ref 3.8–10.5)

## 2019-12-28 PROCEDURE — 99221 1ST HOSP IP/OBS SF/LOW 40: CPT

## 2019-12-28 PROCEDURE — 93010 ELECTROCARDIOGRAM REPORT: CPT

## 2019-12-28 PROCEDURE — 99291 CRITICAL CARE FIRST HOUR: CPT

## 2019-12-28 PROCEDURE — 71045 X-RAY EXAM CHEST 1 VIEW: CPT | Mod: 26

## 2019-12-28 RX ORDER — ENOXAPARIN SODIUM 100 MG/ML
40 INJECTION SUBCUTANEOUS DAILY
Refills: 0 | Status: DISCONTINUED | OUTPATIENT
Start: 2019-12-28 | End: 2019-12-29

## 2019-12-28 RX ORDER — SODIUM CHLORIDE 9 MG/ML
1000 INJECTION, SOLUTION INTRAVENOUS
Refills: 0 | Status: DISCONTINUED | OUTPATIENT
Start: 2019-12-28 | End: 2019-12-29

## 2019-12-28 RX ORDER — POTASSIUM CHLORIDE 20 MEQ
40 PACKET (EA) ORAL EVERY 4 HOURS
Refills: 0 | Status: COMPLETED | OUTPATIENT
Start: 2019-12-28 | End: 2019-12-29

## 2019-12-28 RX ORDER — MAGNESIUM SULFATE 500 MG/ML
1 VIAL (ML) INJECTION ONCE
Refills: 0 | Status: DISCONTINUED | OUTPATIENT
Start: 2019-12-28 | End: 2019-12-28

## 2019-12-28 RX ORDER — DEXTROSE 50 % IN WATER 50 %
25 SYRINGE (ML) INTRAVENOUS ONCE
Refills: 0 | Status: DISCONTINUED | OUTPATIENT
Start: 2019-12-28 | End: 2019-12-29

## 2019-12-28 RX ORDER — INSULIN GLARGINE 100 [IU]/ML
18 INJECTION, SOLUTION SUBCUTANEOUS
Refills: 0 | Status: DISCONTINUED | OUTPATIENT
Start: 2019-12-28 | End: 2019-12-29

## 2019-12-28 RX ORDER — INSULIN HUMAN 100 [IU]/ML
6 INJECTION, SOLUTION SUBCUTANEOUS ONCE
Refills: 0 | Status: DISCONTINUED | OUTPATIENT
Start: 2019-12-28 | End: 2019-12-28

## 2019-12-28 RX ORDER — DEXTROSE 50 % IN WATER 50 %
15 SYRINGE (ML) INTRAVENOUS ONCE
Refills: 0 | Status: DISCONTINUED | OUTPATIENT
Start: 2019-12-28 | End: 2019-12-29

## 2019-12-28 RX ORDER — CHLORHEXIDINE GLUCONATE 213 G/1000ML
1 SOLUTION TOPICAL
Refills: 0 | Status: DISCONTINUED | OUTPATIENT
Start: 2019-12-28 | End: 2019-12-29

## 2019-12-28 RX ORDER — MAGNESIUM SULFATE 500 MG/ML
2 VIAL (ML) INJECTION ONCE
Refills: 0 | Status: COMPLETED | OUTPATIENT
Start: 2019-12-28 | End: 2019-12-28

## 2019-12-28 RX ORDER — SODIUM CHLORIDE 9 MG/ML
2000 INJECTION INTRAMUSCULAR; INTRAVENOUS; SUBCUTANEOUS ONCE
Refills: 0 | Status: COMPLETED | OUTPATIENT
Start: 2019-12-28 | End: 2019-12-28

## 2019-12-28 RX ORDER — ACETAMINOPHEN 500 MG
975 TABLET ORAL ONCE
Refills: 0 | Status: COMPLETED | OUTPATIENT
Start: 2019-12-28 | End: 2019-12-28

## 2019-12-28 RX ORDER — POTASSIUM CHLORIDE 20 MEQ
10 PACKET (EA) ORAL
Refills: 0 | Status: DISCONTINUED | OUTPATIENT
Start: 2019-12-28 | End: 2019-12-28

## 2019-12-28 RX ORDER — CEFTRIAXONE 500 MG/1
1000 INJECTION, POWDER, FOR SOLUTION INTRAMUSCULAR; INTRAVENOUS ONCE
Refills: 0 | Status: COMPLETED | OUTPATIENT
Start: 2019-12-28 | End: 2019-12-28

## 2019-12-28 RX ORDER — DEXTROSE 50 % IN WATER 50 %
12.5 SYRINGE (ML) INTRAVENOUS ONCE
Refills: 0 | Status: DISCONTINUED | OUTPATIENT
Start: 2019-12-28 | End: 2019-12-29

## 2019-12-28 RX ORDER — GLUCAGON INJECTION, SOLUTION 0.5 MG/.1ML
1 INJECTION, SOLUTION SUBCUTANEOUS ONCE
Refills: 0 | Status: DISCONTINUED | OUTPATIENT
Start: 2019-12-28 | End: 2019-12-29

## 2019-12-28 RX ORDER — INSULIN HUMAN 100 [IU]/ML
6 INJECTION, SOLUTION SUBCUTANEOUS
Qty: 100 | Refills: 0 | Status: DISCONTINUED | OUTPATIENT
Start: 2019-12-28 | End: 2019-12-29

## 2019-12-28 RX ADMIN — CEFTRIAXONE 1000 MILLIGRAM(S): 500 INJECTION, POWDER, FOR SOLUTION INTRAMUSCULAR; INTRAVENOUS at 12:00

## 2019-12-28 RX ADMIN — SODIUM CHLORIDE 150 MILLILITER(S): 9 INJECTION, SOLUTION INTRAVENOUS at 13:49

## 2019-12-28 RX ADMIN — INSULIN HUMAN 6 UNIT(S)/HR: 100 INJECTION, SOLUTION SUBCUTANEOUS at 13:49

## 2019-12-28 RX ADMIN — Medication 975 MILLIGRAM(S): at 11:45

## 2019-12-28 RX ADMIN — CEFTRIAXONE 100 MILLIGRAM(S): 500 INJECTION, POWDER, FOR SOLUTION INTRAMUSCULAR; INTRAVENOUS at 11:46

## 2019-12-28 RX ADMIN — Medication 50 GRAM(S): at 23:01

## 2019-12-28 RX ADMIN — SODIUM CHLORIDE 2000 MILLILITER(S): 9 INJECTION INTRAMUSCULAR; INTRAVENOUS; SUBCUTANEOUS at 10:56

## 2019-12-28 RX ADMIN — SODIUM CHLORIDE 2000 MILLILITER(S): 9 INJECTION INTRAMUSCULAR; INTRAVENOUS; SUBCUTANEOUS at 12:24

## 2019-12-28 RX ADMIN — Medication 975 MILLIGRAM(S): at 10:56

## 2019-12-28 RX ADMIN — ENOXAPARIN SODIUM 40 MILLIGRAM(S): 100 INJECTION SUBCUTANEOUS at 22:07

## 2019-12-28 RX ADMIN — SODIUM CHLORIDE 2000 MILLILITER(S): 9 INJECTION INTRAMUSCULAR; INTRAVENOUS; SUBCUTANEOUS at 11:56

## 2019-12-28 RX ADMIN — CHLORHEXIDINE GLUCONATE 1 APPLICATION(S): 213 SOLUTION TOPICAL at 22:08

## 2019-12-28 RX ADMIN — Medication 40 MILLIEQUIVALENT(S): at 22:45

## 2019-12-28 RX ADMIN — SODIUM CHLORIDE 2000 MILLILITER(S): 9 INJECTION INTRAMUSCULAR; INTRAVENOUS; SUBCUTANEOUS at 13:24

## 2019-12-28 RX ADMIN — INSULIN GLARGINE 18 UNIT(S): 100 INJECTION, SOLUTION SUBCUTANEOUS at 23:36

## 2019-12-28 NOTE — ED ADULT NURSE NOTE - OBJECTIVE STATEMENT
pt came to ED c/o "my chest hurts, I didn't take insulin at all yesterday and I'm a diabetic." Pt states she gave herself 20 units of short acting insulin at 7am then gave herself an additional 12 units PTA. Pt also reports "throwing up acid all night."  Pt appears very anxious. Brought to critical care upon arrival to the ED, MD at bedside. A&O x4. Denies any N/D, headaches, dizziness, blurred vision,  symptoms. sinus tach on the monitor. Safety maintained.

## 2019-12-28 NOTE — H&P ADULT - PROBLEM SELECTOR PLAN 3
pan cultures sent in ER  afebrile  UA and CXR negative got a dose Rocephin in ER will hold off as no clear source at present, if febrile will need to restart antibiotics

## 2019-12-28 NOTE — H&P ADULT - ATTENDING COMMENTS
20 yo female with DKA due to insulin nonadherence, no signs of infection.  Also with ALEJANDRO and dehydration.  Will provide insulin infusion, IV hydration, replete electrolytes.

## 2019-12-28 NOTE — ED PROVIDER NOTE - PHYSICAL EXAMINATION
Constitutional - well-developed; well nourished. mucous membranes dry. Head - NCAT. Airway patent. Eyes - PERRL. CV - tachycardic. no murmur. no edema. Pulm - CTAB. tachypneic. Abd - soft, nt. no rebound. no guarding. Neuro - A&Ox3. strength 5/5 x4. sensation intact x4. normal gait. Skin - No rash. MSK - normal ROM.

## 2019-12-28 NOTE — ED PROVIDER NOTE - CLINICAL SUMMARY MEDICAL DECISION MAKING FREE TEXT BOX
labs and imaging reviewed.  Pt with DKA.  IVF given. insulin drip started.  Case d/w ICU PA who will accept to unit. Dr. Simental to admit.
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
## 2019-12-28 NOTE — H&P ADULT - PROBLEM SELECTOR PLAN 1
volume resuscitation  insulin infusion  hourly accuchecks  monitor and replete electrolytes every 6 hours as needed  will need endocrine evaluation in am

## 2019-12-28 NOTE — ED PROVIDER NOTE - OBJECTIVE STATEMENT
Pt is a 18 yo F co rib pain, vomiting, hyperglycemia.  PMHx significant for IDDM.  Pt states that she was without her insulin all day yesterday as she couldn't get to it. Pt states that overnight she started to have recurrent episodes of n/v and this morning started to have bilateral rib pain.  Pt states that she has had DKA in the past but not recently. no fever/chills. no other complaints.

## 2019-12-28 NOTE — H&P ADULT - MENTAL STATUS
sleepy, arousable, keeps falling asleep between questions  LAMAS well  CN grossly intact-pt not very cooperative with exam

## 2019-12-28 NOTE — ED PROVIDER NOTE - NS ED ROS FT
No fever/chills, No photophobia/eye pain/changes in vision, No ear pain/sore throat/dysphagia, No chest pain/palpitations, no SOB/cough/wheeze/stridor, No abdominal pain, + N/V no diarrhea, no dysuria/frequency/discharge, No neck/back pain, no rash, no changes in neurological status/function.

## 2019-12-28 NOTE — ED ADULT TRIAGE NOTE - CHIEF COMPLAINT QUOTE
Patient arrived to ED today with c/o body pains, trouble breathing, and hyperglycemia.  Patient states her sugar where over 600 today.

## 2019-12-28 NOTE — H&P ADULT - NSICDXPASTSURGICALHX_GEN_ALL_CORE_FT
PAST SURGICAL HISTORY:  S/P dilatation and curettage miscarriage x 2    S/P tonsillectomy and adenoidectomy     S/P tonsillectomy and adenoidectomy

## 2019-12-29 ENCOUNTER — TRANSCRIPTION ENCOUNTER (OUTPATIENT)
Age: 19
End: 2019-12-29

## 2019-12-29 VITALS
HEART RATE: 93 BPM | SYSTOLIC BLOOD PRESSURE: 110 MMHG | OXYGEN SATURATION: 99 % | RESPIRATION RATE: 20 BRPM | DIASTOLIC BLOOD PRESSURE: 73 MMHG | TEMPERATURE: 98 F

## 2019-12-29 LAB
ANION GAP SERPL CALC-SCNC: 13 MMOL/L — SIGNIFICANT CHANGE UP (ref 5–17)
BUN SERPL-MCNC: 8 MG/DL — SIGNIFICANT CHANGE UP (ref 8–20)
CALCIUM SERPL-MCNC: 6.8 MG/DL — LOW (ref 8.6–10.2)
CHLORIDE SERPL-SCNC: 115 MMOL/L — HIGH (ref 98–107)
CO2 SERPL-SCNC: 12 MMOL/L — LOW (ref 22–29)
CREAT SERPL-MCNC: 0.62 MG/DL — SIGNIFICANT CHANGE UP (ref 0.5–1.3)
CULTURE RESULTS: NO GROWTH — SIGNIFICANT CHANGE UP
GLUCOSE BLDC GLUCOMTR-MCNC: 106 MG/DL — HIGH (ref 70–99)
GLUCOSE BLDC GLUCOMTR-MCNC: 121 MG/DL — HIGH (ref 70–99)
GLUCOSE BLDC GLUCOMTR-MCNC: 180 MG/DL — HIGH (ref 70–99)
GLUCOSE BLDC GLUCOMTR-MCNC: 230 MG/DL — HIGH (ref 70–99)
GLUCOSE BLDC GLUCOMTR-MCNC: 275 MG/DL — HIGH (ref 70–99)
GLUCOSE SERPL-MCNC: 187 MG/DL — HIGH (ref 70–115)
HBA1C BLD-MCNC: 13 % — HIGH (ref 4–5.6)
HCT VFR BLD CALC: 31.7 % — LOW (ref 34.5–45)
HGB BLD-MCNC: 11 G/DL — LOW (ref 11.5–15.5)
MAGNESIUM SERPL-MCNC: 2.2 MG/DL — SIGNIFICANT CHANGE UP (ref 1.6–2.6)
MCHC RBC-ENTMCNC: 31.1 PG — SIGNIFICANT CHANGE UP (ref 27–34)
MCHC RBC-ENTMCNC: 34.7 GM/DL — SIGNIFICANT CHANGE UP (ref 32–36)
MCV RBC AUTO: 89.5 FL — SIGNIFICANT CHANGE UP (ref 80–100)
PHOSPHATE SERPL-MCNC: 2.2 MG/DL — LOW (ref 2.4–4.7)
PLATELET # BLD AUTO: 298 K/UL — SIGNIFICANT CHANGE UP (ref 150–400)
POTASSIUM SERPL-MCNC: 4.2 MMOL/L — SIGNIFICANT CHANGE UP (ref 3.5–5.3)
POTASSIUM SERPL-SCNC: 4.2 MMOL/L — SIGNIFICANT CHANGE UP (ref 3.5–5.3)
RBC # BLD: 3.54 M/UL — LOW (ref 3.8–5.2)
RBC # FLD: 11.1 % — SIGNIFICANT CHANGE UP (ref 10.3–14.5)
SODIUM SERPL-SCNC: 140 MMOL/L — SIGNIFICANT CHANGE UP (ref 135–145)
SPECIMEN SOURCE: SIGNIFICANT CHANGE UP
WBC # BLD: 10.78 K/UL — HIGH (ref 3.8–10.5)
WBC # FLD AUTO: 10.78 K/UL — HIGH (ref 3.8–10.5)

## 2019-12-29 PROCEDURE — 87040 BLOOD CULTURE FOR BACTERIA: CPT

## 2019-12-29 PROCEDURE — 99254 IP/OBS CNSLTJ NEW/EST MOD 60: CPT

## 2019-12-29 PROCEDURE — 82330 ASSAY OF CALCIUM: CPT

## 2019-12-29 PROCEDURE — 96361 HYDRATE IV INFUSION ADD-ON: CPT

## 2019-12-29 PROCEDURE — 85027 COMPLETE CBC AUTOMATED: CPT

## 2019-12-29 PROCEDURE — 36415 COLL VENOUS BLD VENIPUNCTURE: CPT

## 2019-12-29 PROCEDURE — 84702 CHORIONIC GONADOTROPIN TEST: CPT

## 2019-12-29 PROCEDURE — 82803 BLOOD GASES ANY COMBINATION: CPT

## 2019-12-29 PROCEDURE — 84295 ASSAY OF SERUM SODIUM: CPT

## 2019-12-29 PROCEDURE — 99285 EMERGENCY DEPT VISIT HI MDM: CPT | Mod: 25

## 2019-12-29 PROCEDURE — 82947 ASSAY GLUCOSE BLOOD QUANT: CPT

## 2019-12-29 PROCEDURE — 83036 HEMOGLOBIN GLYCOSYLATED A1C: CPT

## 2019-12-29 PROCEDURE — 93005 ELECTROCARDIOGRAM TRACING: CPT

## 2019-12-29 PROCEDURE — 82962 GLUCOSE BLOOD TEST: CPT

## 2019-12-29 PROCEDURE — 82435 ASSAY OF BLOOD CHLORIDE: CPT

## 2019-12-29 PROCEDURE — 71045 X-RAY EXAM CHEST 1 VIEW: CPT

## 2019-12-29 PROCEDURE — 83735 ASSAY OF MAGNESIUM: CPT

## 2019-12-29 PROCEDURE — 99233 SBSQ HOSP IP/OBS HIGH 50: CPT

## 2019-12-29 PROCEDURE — 96374 THER/PROPH/DIAG INJ IV PUSH: CPT

## 2019-12-29 PROCEDURE — 87086 URINE CULTURE/COLONY COUNT: CPT

## 2019-12-29 PROCEDURE — 84100 ASSAY OF PHOSPHORUS: CPT

## 2019-12-29 PROCEDURE — 82010 KETONE BODYS QUAN: CPT

## 2019-12-29 PROCEDURE — 80048 BASIC METABOLIC PNL TOTAL CA: CPT

## 2019-12-29 PROCEDURE — 81001 URINALYSIS AUTO W/SCOPE: CPT

## 2019-12-29 PROCEDURE — 85014 HEMATOCRIT: CPT

## 2019-12-29 PROCEDURE — 83605 ASSAY OF LACTIC ACID: CPT

## 2019-12-29 PROCEDURE — 80053 COMPREHEN METABOLIC PANEL: CPT

## 2019-12-29 PROCEDURE — 84132 ASSAY OF SERUM POTASSIUM: CPT

## 2019-12-29 PROCEDURE — 99239 HOSP IP/OBS DSCHRG MGMT >30: CPT

## 2019-12-29 RX ORDER — DEXTROSE 50 % IN WATER 50 %
25 SYRINGE (ML) INTRAVENOUS ONCE
Refills: 0 | Status: DISCONTINUED | OUTPATIENT
Start: 2019-12-29 | End: 2019-12-29

## 2019-12-29 RX ORDER — SODIUM CHLORIDE 9 MG/ML
1000 INJECTION, SOLUTION INTRAVENOUS
Refills: 0 | Status: DISCONTINUED | OUTPATIENT
Start: 2019-12-29 | End: 2019-12-29

## 2019-12-29 RX ORDER — INSULIN LISPRO 100/ML
6 VIAL (ML) SUBCUTANEOUS
Refills: 0 | Status: DISCONTINUED | OUTPATIENT
Start: 2019-12-29 | End: 2019-12-29

## 2019-12-29 RX ORDER — INSULIN GLARGINE 100 [IU]/ML
24 INJECTION, SOLUTION SUBCUTANEOUS AT BEDTIME
Refills: 0 | Status: DISCONTINUED | OUTPATIENT
Start: 2019-12-29 | End: 2019-12-29

## 2019-12-29 RX ORDER — INSULIN GLARGINE 100 [IU]/ML
30 INJECTION, SOLUTION SUBCUTANEOUS
Qty: 30 | Refills: 0
Start: 2019-12-29 | End: 2020-01-27

## 2019-12-29 RX ORDER — DEXTROSE 50 % IN WATER 50 %
12.5 SYRINGE (ML) INTRAVENOUS ONCE
Refills: 0 | Status: DISCONTINUED | OUTPATIENT
Start: 2019-12-29 | End: 2019-12-29

## 2019-12-29 RX ORDER — DEXTROSE 50 % IN WATER 50 %
15 SYRINGE (ML) INTRAVENOUS ONCE
Refills: 0 | Status: DISCONTINUED | OUTPATIENT
Start: 2019-12-29 | End: 2019-12-29

## 2019-12-29 RX ORDER — GLUCAGON INJECTION, SOLUTION 0.5 MG/.1ML
1 INJECTION, SOLUTION SUBCUTANEOUS ONCE
Refills: 0 | Status: DISCONTINUED | OUTPATIENT
Start: 2019-12-29 | End: 2019-12-29

## 2019-12-29 RX ORDER — INSULIN LISPRO 100/ML
VIAL (ML) SUBCUTANEOUS
Refills: 0 | Status: DISCONTINUED | OUTPATIENT
Start: 2019-12-29 | End: 2019-12-29

## 2019-12-29 RX ADMIN — Medication 6 UNIT(S): at 08:37

## 2019-12-29 RX ADMIN — Medication 6 UNIT(S): at 16:52

## 2019-12-29 RX ADMIN — Medication 40 MILLIEQUIVALENT(S): at 02:31

## 2019-12-29 RX ADMIN — CHLORHEXIDINE GLUCONATE 1 APPLICATION(S): 213 SOLUTION TOPICAL at 05:33

## 2019-12-29 RX ADMIN — Medication 4: at 08:37

## 2019-12-29 RX ADMIN — Medication 6: at 16:51

## 2019-12-29 NOTE — PROGRESS NOTE ADULT - ASSESSMENT
18 yo female with DM, asthma, admitted with DKA after missing her insulin doses, now doing better, DKA resolved.    Plan  - lantus and premeal insulin  - endocrine consult  - downgrade from MICU

## 2019-12-29 NOTE — DISCHARGE NOTE PROVIDER - NSDCCPCAREPLAN_GEN_ALL_CORE_FT
PRINCIPAL DISCHARGE DIAGNOSIS  Diagnosis: Diabetic ketoacidosis without coma associated with type 1 diabetes mellitus  Assessment and Plan of Treatment:       SECONDARY DISCHARGE DIAGNOSES  Diagnosis: Noncompliance  Assessment and Plan of Treatment:
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
## 2019-12-29 NOTE — DISCHARGE NOTE PROVIDER - CARE PROVIDER_API CALL
Danish Lucia (MD)  EndocrinologyMetabDiabetes; Internal Medicine  [address] [place] [address]  Phone: [phone]  Fax: [phone]  Follow Up Time:     primary care,   Phone: (   )    -  Fax: (   )    -  Follow Up Time:
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
## 2019-12-29 NOTE — DISCHARGE NOTE NURSING/CASE MANAGEMENT/SOCIAL WORK - PATIENT PORTAL LINK FT
You can access the FollowMyHealth Patient Portal offered by [facility] by registering at the following website: [email or web address]. By joining [employer]’s FollowMyHealth portal, you will also be able to view your health information using other applications (apps) compatible with our system.

## 2019-12-29 NOTE — CONSULT NOTE ADULT - SUBJECTIVE AND OBJECTIVE BOX
HPI:  Pt is 19 YOF h/o DM type I who presented to ED with c/o generalized body aches, nausea and vomiting that started last night.  At this time pt is refusing to talk because she is tired but she admits that she did tell the ER RN that she was living with her boyfriend until he "kicked her out" 2 days ago and she has not been able to get her insulin which is at their apartment because he won't answer her phone calls.  She states she is staying with a friend.  Pt denies that she was trying to hurt herself by not taking her insulin, she states she just couldn't get it. (28 Dec 2019 16:14)    T1DM since 2 yrs old. H/o multiple admission for DKA. H/o poorly control glucoses as outpt. Denies microvascular disease.  admits to nonadherence with insulin as outpt.      PAST MEDICAL & SURGICAL HISTORY:  Asthma  Diabetes  Diabetes mellitus type 1  S/P dilatation and curettage: miscarriage x 2  S/P tonsillectomy and adenoidectomy  S/P tonsillectomy and adenoidectomy      FAMILY HISTORY:  No pertinent family history in first degree relatives      SOCIAL HISTORY: denies tobacco/illicit drugs/EtOH    MEDICATIONS  (STANDING):  chlorhexidine 4% Liquid 1 Application(s) Topical <User Schedule>  dextrose 5%. 1000 milliLiter(s) (50 mL/Hr) IV Continuous <Continuous>  dextrose 50% Injectable 12.5 Gram(s) IV Push once  dextrose 50% Injectable 25 Gram(s) IV Push once  dextrose 50% Injectable 25 Gram(s) IV Push once  enoxaparin Injectable 40 milliGRAM(s) SubCutaneous daily  insulin glargine Injectable (LANTUS) 24 Unit(s) SubCutaneous at bedtime  insulin lispro (HumaLOG) corrective regimen sliding scale   SubCutaneous three times a day before meals  insulin lispro Injectable (HumaLOG) 6 Unit(s) SubCutaneous before breakfast  insulin lispro Injectable (HumaLOG) 6 Unit(s) SubCutaneous before lunch  insulin lispro Injectable (HumaLOG) 6 Unit(s) SubCutaneous before dinner    MEDICATIONS  (PRN):  dextrose 40% Gel 15 Gram(s) Oral once PRN Blood Glucose LESS THAN 70 milliGRAM(s)/deciliter  glucagon  Injectable 1 milliGRAM(s) IntraMuscular once PRN Glucose LESS THAN 70 milligrams/deciliter      ALLERGIES: apple (Unknown)  No Known Drug Allergies  Pears (Unknown)      REVIEW OF SYSTEMS:  CONSTITUTIONAL: No fever, weight loss, or fatigue  EYES: No eye pain, No blurry vision  RESPIRATORY: No cough; No shortness of breath  CARDIOVASCULAR: No chest pain, palpitations, or dizziness  GASTROINTESTINAL: No abdominal or epigastric pain. No nausea/ vomiting,  GENITOURINARY: No dysuria/frequency  NEUROLOGICAL: No headaches  SKIN: No itching, burning, rashes, or lesions   ENDOCRINE: No thirst  MUSCULOSKELETAL: No joint pain or swelling; No muscle, back, or extremity pain  PSYCHIATRIC: No depression/anxiety      Vital Signs Last 24 Hrs  T(C): 36.8 (29 Dec 2019 14:26), Max: 37.1 (28 Dec 2019 21:00)  T(F): 98.2 (29 Dec 2019 14:26), Max: 98.7 (28 Dec 2019 21:00)  HR: 107 (29 Dec 2019 12:00) (84 - 111)  BP: 107/70 (29 Dec 2019 12:00) (87/47 - 119/80)  BP(mean): 82 (29 Dec 2019 12:00) (64 - 93)  RR: 21 (29 Dec 2019 12:00) (12 - 35)  SpO2: 100% (29 Dec 2019 12:00) (98% - 100%)    Physical Exam:  General appearance: Well developed, well nourished.  Eyes: Pupils equal. EOMI  Lungs: Normal respiratory excursion. Lungs clear no w/r/r  CV: Normal S1S2, regular. No m/r/g.  Pedal pulses intact.  Abdomen: Soft, nontender, nondistended, (+) BS  Musculoskeletal: No cyanosis, clubbing, or edema. No pedal lesions.  Skin: Warm and moist. No rash. No acanthosis.  Neuro: Cranial nerves intact. Good sensation to light touch.  DTR's normal.  Psych: Normal affect, good judgement.      LABS:                        11.0   10.78 )-----------( 298      ( 29 Dec 2019 02:51 )             31.7         140  |  115<H>  |  8.0  ----------------------------<  187<H>  4.2   |  12.0<L>  |  0.62    Ca    6.8<L>      29 Dec 2019 02:51  Phos  2.2       Mg     2.2         TPro  7.0  /  Alb  4.0  /  TBili  <0.2<L>  /  DBili  x   /  AST  35<H>  /  ALT  18  /  AlkPhos  142<H>      LIVER FUNCTIONS - ( 28 Dec 2019 12:16 )  Alb: 4.0 g/dL / Pro: 7.0 g/dL / ALK PHOS: 142 U/L / ALT: 18 U/L / AST: 35 U/L / GGT: x             Hemoglobin A1C, Whole Blood: 13.0 % <H> [4.0 - 5.6] (19 @ 02:51)      CAPILLARY BLOOD GLUCOSE  POCT Blood Glucose.: 106 mg/dL (29 Dec 2019 12:01)  POCT Blood Glucose.: 230 mg/dL (29 Dec 2019 08:32)  POCT Blood Glucose.: 180 mg/dL (29 Dec 2019 02:28)  POCT Blood Glucose.: 121 mg/dL (29 Dec 2019 00:59)  POCT Blood Glucose.: 93 mg/dL (28 Dec 2019 23:39)  POCT Blood Glucose.: 91 mg/dL (28 Dec 2019 23:02)  POCT Blood Glucose.: 102 mg/dL (28 Dec 2019 21:24)  POCT Blood Glucose.: 93 mg/dL (28 Dec 2019 20:41)  POCT Blood Glucose.: 88 mg/dL (28 Dec 2019 19:26)  POCT Blood Glucose.: 88 mg/dL (28 Dec 2019 18:23)  POCT Blood Glucose.: 84 mg/dL (28 Dec 2019 17:32)  POCT Blood Glucose.: 75 mg/dL (28 Dec 2019 17:05)  POCT Blood Glucose.: 77 mg/dL (28 Dec 2019 15:58)  POCT Blood Glucose.: 118 mg/dL (28 Dec 2019 15:02)  POCT Blood Glucose.: >530 mg/dL (28 Dec 2019 10:42)        Urinalysis Basic - ( 28 Dec 2019 13:12 )    Color: Yellow / Appearance: Slightly Turbid / S.020 / pH: x  Gluc: x / Ketone: Large  / Bili: Negative / Urobili: Negative mg/dL   Blood: x / Protein: 30 mg/dL / Nitrite: Negative   Leuk Esterase: Trace / RBC: 3-5 /HPF / WBC 3-5   Sq Epi: x / Non Sq Epi: Moderate / Bacteria: Few

## 2020-01-15 ENCOUNTER — TRANSCRIPTION ENCOUNTER (OUTPATIENT)
Age: 20
End: 2020-01-15

## 2020-02-26 NOTE — ED PROVIDER NOTE - CROS ED GI ALL NEG
- - -
hgb 10 guiac negative case d/w Dr. Dawson advised to cover with abx with ceftin will f/u in am

## 2020-03-01 ENCOUNTER — OUTPATIENT (OUTPATIENT)
Dept: OUTPATIENT SERVICES | Facility: HOSPITAL | Age: 20
LOS: 1 days | End: 2020-03-01
Payer: MEDICAID

## 2020-03-01 DIAGNOSIS — Z98.890 OTHER SPECIFIED POSTPROCEDURAL STATES: Chronic | ICD-10-CM

## 2020-03-01 PROCEDURE — G9001: CPT

## 2020-03-11 ENCOUNTER — INPATIENT (INPATIENT)
Facility: HOSPITAL | Age: 20
LOS: 1 days | Discharge: HOME | DRG: 637 | End: 2020-03-13
Attending: FAMILY MEDICINE | Admitting: INTERNAL MEDICINE
Payer: MEDICAID

## 2020-03-11 VITALS
RESPIRATION RATE: 32 BRPM | WEIGHT: 98.99 LBS | SYSTOLIC BLOOD PRESSURE: 130 MMHG | OXYGEN SATURATION: 99 % | HEART RATE: 130 BPM | HEIGHT: 60 IN | DIASTOLIC BLOOD PRESSURE: 89 MMHG

## 2020-03-11 DIAGNOSIS — E10.9 TYPE 1 DIABETES MELLITUS WITHOUT COMPLICATIONS: ICD-10-CM

## 2020-03-11 DIAGNOSIS — E10.10 TYPE 1 DIABETES MELLITUS WITH KETOACIDOSIS WITHOUT COMA: ICD-10-CM

## 2020-03-11 DIAGNOSIS — E11.10 TYPE 2 DIABETES MELLITUS WITH KETOACIDOSIS WITHOUT COMA: ICD-10-CM

## 2020-03-11 DIAGNOSIS — Z98.890 OTHER SPECIFIED POSTPROCEDURAL STATES: Chronic | ICD-10-CM

## 2020-03-11 DIAGNOSIS — E86.0 DEHYDRATION: ICD-10-CM

## 2020-03-11 DIAGNOSIS — Z98.891 HISTORY OF UTERINE SCAR FROM PREVIOUS SURGERY: Chronic | ICD-10-CM

## 2020-03-11 DIAGNOSIS — E87.5 HYPERKALEMIA: ICD-10-CM

## 2020-03-11 LAB
ACETONE SERPL-MCNC: ABNORMAL
ALBUMIN SERPL ELPH-MCNC: 3.8 G/DL — SIGNIFICANT CHANGE UP (ref 3.3–5.2)
ALBUMIN SERPL ELPH-MCNC: 3.9 G/DL — SIGNIFICANT CHANGE UP (ref 3.3–5.2)
ALP SERPL-CCNC: 124 U/L — HIGH (ref 40–120)
ALP SERPL-CCNC: 140 U/L — HIGH (ref 40–120)
ALT FLD-CCNC: 39 U/L — HIGH
ALT FLD-CCNC: 45 U/L — HIGH
AMPHET UR-MCNC: NEGATIVE — SIGNIFICANT CHANGE UP
ANION GAP SERPL CALC-SCNC: 17 MMOL/L — SIGNIFICANT CHANGE UP (ref 5–17)
ANION GAP SERPL CALC-SCNC: 23 MMOL/L — HIGH (ref 5–17)
ANION GAP SERPL CALC-SCNC: 37 MMOL/L — HIGH (ref 5–17)
ANION GAP SERPL CALC-SCNC: >37 MMOL/L — HIGH (ref 5–17)
APPEARANCE UR: CLEAR — SIGNIFICANT CHANGE UP
AST SERPL-CCNC: 50 U/L — HIGH
AST SERPL-CCNC: 66 U/L — HIGH
BACTERIA # UR AUTO: NEGATIVE — SIGNIFICANT CHANGE UP
BARBITURATES UR SCN-MCNC: NEGATIVE — SIGNIFICANT CHANGE UP
BASE EXCESS BLDV CALC-SCNC: -24.6 MMOL/L — LOW (ref -2–2)
BASOPHILS # BLD AUTO: 0.03 K/UL — SIGNIFICANT CHANGE UP (ref 0–0.2)
BASOPHILS NFR BLD AUTO: 0.3 % — SIGNIFICANT CHANGE UP (ref 0–2)
BENZODIAZ UR-MCNC: NEGATIVE — SIGNIFICANT CHANGE UP
BILIRUB SERPL-MCNC: 0.2 MG/DL — LOW (ref 0.4–2)
BILIRUB SERPL-MCNC: 0.3 MG/DL — LOW (ref 0.4–2)
BILIRUB UR-MCNC: NEGATIVE — SIGNIFICANT CHANGE UP
BUN SERPL-MCNC: 11 MG/DL — SIGNIFICANT CHANGE UP (ref 8–20)
BUN SERPL-MCNC: 16 MG/DL — SIGNIFICANT CHANGE UP (ref 8–20)
BUN SERPL-MCNC: 21 MG/DL — HIGH (ref 8–20)
BUN SERPL-MCNC: 24 MG/DL — HIGH (ref 8–20)
CA-I SERPL-SCNC: 1.02 MMOL/L — LOW (ref 1.15–1.33)
CALCIUM SERPL-MCNC: 8.4 MG/DL — LOW (ref 8.6–10.2)
CALCIUM SERPL-MCNC: 8.5 MG/DL — LOW (ref 8.6–10.2)
CALCIUM SERPL-MCNC: 8.6 MG/DL — SIGNIFICANT CHANGE UP (ref 8.6–10.2)
CALCIUM SERPL-MCNC: 8.9 MG/DL — SIGNIFICANT CHANGE UP (ref 8.6–10.2)
CHLORIDE BLDV-SCNC: 97 MMOL/L — LOW (ref 98–107)
CHLORIDE SERPL-SCNC: 101 MMOL/L — SIGNIFICANT CHANGE UP (ref 98–107)
CHLORIDE SERPL-SCNC: 102 MMOL/L — SIGNIFICANT CHANGE UP (ref 98–107)
CHLORIDE SERPL-SCNC: 88 MMOL/L — LOW (ref 98–107)
CHLORIDE SERPL-SCNC: 96 MMOL/L — LOW (ref 98–107)
CO2 SERPL-SCNC: 15 MMOL/L — LOW (ref 22–29)
CO2 SERPL-SCNC: 19 MMOL/L — LOW (ref 22–29)
CO2 SERPL-SCNC: 7 MMOL/L — CRITICAL LOW (ref 22–29)
CO2 SERPL-SCNC: <6 MMOL/L — CRITICAL LOW (ref 22–29)
COCAINE METAB.OTHER UR-MCNC: NEGATIVE — SIGNIFICANT CHANGE UP
COLOR SPEC: YELLOW — SIGNIFICANT CHANGE UP
CREAT SERPL-MCNC: 0.76 MG/DL — SIGNIFICANT CHANGE UP (ref 0.5–1.3)
CREAT SERPL-MCNC: 0.85 MG/DL — SIGNIFICANT CHANGE UP (ref 0.5–1.3)
CREAT SERPL-MCNC: 0.98 MG/DL — SIGNIFICANT CHANGE UP (ref 0.5–1.3)
CREAT SERPL-MCNC: 1.11 MG/DL — SIGNIFICANT CHANGE UP (ref 0.5–1.3)
DIFF PNL FLD: ABNORMAL
EOSINOPHIL # BLD AUTO: 0.01 K/UL — SIGNIFICANT CHANGE UP (ref 0–0.5)
EOSINOPHIL NFR BLD AUTO: 0.1 % — SIGNIFICANT CHANGE UP (ref 0–6)
EPI CELLS # UR: SIGNIFICANT CHANGE UP
GAS PNL BLDV: 131 MMOL/L — LOW (ref 135–145)
GAS PNL BLDV: SIGNIFICANT CHANGE UP
GAS PNL BLDV: SIGNIFICANT CHANGE UP
GLUCOSE BLDC GLUCOMTR-MCNC: 114 MG/DL — HIGH (ref 70–99)
GLUCOSE BLDC GLUCOMTR-MCNC: 118 MG/DL — HIGH (ref 70–99)
GLUCOSE BLDC GLUCOMTR-MCNC: 147 MG/DL — HIGH (ref 70–99)
GLUCOSE BLDC GLUCOMTR-MCNC: 170 MG/DL — HIGH (ref 70–99)
GLUCOSE BLDC GLUCOMTR-MCNC: 325 MG/DL — HIGH (ref 70–99)
GLUCOSE BLDC GLUCOMTR-MCNC: 444 MG/DL — HIGH (ref 70–99)
GLUCOSE BLDV-MCNC: 806 MG/DL — CRITICAL HIGH (ref 70–99)
GLUCOSE SERPL-MCNC: 112 MG/DL — HIGH (ref 70–99)
GLUCOSE SERPL-MCNC: 133 MG/DL — HIGH (ref 70–99)
GLUCOSE SERPL-MCNC: 313 MG/DL — HIGH (ref 70–99)
GLUCOSE SERPL-MCNC: 742 MG/DL — CRITICAL HIGH (ref 70–99)
GLUCOSE UR QL: 1000 MG/DL
HCG SERPL-ACNC: <4 MIU/ML — SIGNIFICANT CHANGE UP
HCO3 BLDV-SCNC: 8 MMOL/L — LOW (ref 21–29)
HCT VFR BLD CALC: 51.1 % — HIGH (ref 34.5–45)
HCT VFR BLDA CALC: 50 — SIGNIFICANT CHANGE UP (ref 39–50)
HGB BLD CALC-MCNC: 16.3 G/DL — HIGH (ref 11.5–15.5)
HGB BLD-MCNC: 15.6 G/DL — HIGH (ref 11.5–15.5)
IMM GRANULOCYTES NFR BLD AUTO: 0.7 % — SIGNIFICANT CHANGE UP (ref 0–1.5)
KETONES UR-MCNC: ABNORMAL
LACTATE BLDV-MCNC: 4 MMOL/L — CRITICAL HIGH (ref 0.5–2)
LEUKOCYTE ESTERASE UR-ACNC: NEGATIVE — SIGNIFICANT CHANGE UP
LYMPHOCYTES # BLD AUTO: 18.3 % — SIGNIFICANT CHANGE UP (ref 13–44)
LYMPHOCYTES # BLD AUTO: 2 K/UL — SIGNIFICANT CHANGE UP (ref 1–3.3)
MAGNESIUM SERPL-MCNC: 1.7 MG/DL — SIGNIFICANT CHANGE UP (ref 1.6–2.6)
MCHC RBC-ENTMCNC: 30.5 GM/DL — LOW (ref 32–36)
MCHC RBC-ENTMCNC: 30.5 PG — SIGNIFICANT CHANGE UP (ref 27–34)
MCV RBC AUTO: 100 FL — SIGNIFICANT CHANGE UP (ref 80–100)
METHADONE UR-MCNC: NEGATIVE — SIGNIFICANT CHANGE UP
MONOCYTES # BLD AUTO: 0.38 K/UL — SIGNIFICANT CHANGE UP (ref 0–0.9)
MONOCYTES NFR BLD AUTO: 3.5 % — SIGNIFICANT CHANGE UP (ref 2–14)
NEUTROPHILS # BLD AUTO: 8.4 K/UL — HIGH (ref 1.8–7.4)
NEUTROPHILS NFR BLD AUTO: 77.1 % — HIGH (ref 43–77)
NITRITE UR-MCNC: NEGATIVE — SIGNIFICANT CHANGE UP
OPIATES UR-MCNC: NEGATIVE — SIGNIFICANT CHANGE UP
OTHER CELLS CSF MANUAL: 17 ML/DL — LOW (ref 18–22)
PCO2 BLDV: 22 MMHG — LOW (ref 35–50)
PCP SPEC-MCNC: SIGNIFICANT CHANGE UP
PCP UR-MCNC: NEGATIVE — SIGNIFICANT CHANGE UP
PH BLDV: 7 — CRITICAL LOW (ref 7.32–7.43)
PH UR: 5 — SIGNIFICANT CHANGE UP (ref 5–8)
PHOSPHATE SERPL-MCNC: 1.8 MG/DL — LOW (ref 2.4–4.7)
PLATELET # BLD AUTO: 445 K/UL — HIGH (ref 150–400)
PO2 BLDV: 58 MMHG — HIGH (ref 25–45)
POTASSIUM BLDV-SCNC: 8.5 MMOL/L — CRITICAL HIGH (ref 3.4–4.5)
POTASSIUM SERPL-MCNC: 3.9 MMOL/L — SIGNIFICANT CHANGE UP (ref 3.5–5.3)
POTASSIUM SERPL-MCNC: 3.9 MMOL/L — SIGNIFICANT CHANGE UP (ref 3.5–5.3)
POTASSIUM SERPL-MCNC: 5.9 MMOL/L — HIGH (ref 3.5–5.3)
POTASSIUM SERPL-MCNC: 6.2 MMOL/L — CRITICAL HIGH (ref 3.5–5.3)
POTASSIUM SERPL-SCNC: 3.9 MMOL/L — SIGNIFICANT CHANGE UP (ref 3.5–5.3)
POTASSIUM SERPL-SCNC: 3.9 MMOL/L — SIGNIFICANT CHANGE UP (ref 3.5–5.3)
POTASSIUM SERPL-SCNC: 5.9 MMOL/L — HIGH (ref 3.5–5.3)
POTASSIUM SERPL-SCNC: 6.2 MMOL/L — CRITICAL HIGH (ref 3.5–5.3)
PROT SERPL-MCNC: 6.7 G/DL — SIGNIFICANT CHANGE UP (ref 6.6–8.7)
PROT SERPL-MCNC: 7.1 G/DL — SIGNIFICANT CHANGE UP (ref 6.6–8.7)
PROT UR-MCNC: 30 MG/DL
RBC # BLD: 5.11 M/UL — SIGNIFICANT CHANGE UP (ref 3.8–5.2)
RBC # FLD: 13.2 % — SIGNIFICANT CHANGE UP (ref 10.3–14.5)
RBC CASTS # UR COMP ASSIST: SIGNIFICANT CHANGE UP /HPF (ref 0–4)
SAO2 % BLDV: 76 % — SIGNIFICANT CHANGE UP
SODIUM SERPL-SCNC: 131 MMOL/L — LOW (ref 135–145)
SODIUM SERPL-SCNC: 137 MMOL/L — SIGNIFICANT CHANGE UP (ref 135–145)
SODIUM SERPL-SCNC: 140 MMOL/L — SIGNIFICANT CHANGE UP (ref 135–145)
SODIUM SERPL-SCNC: 140 MMOL/L — SIGNIFICANT CHANGE UP (ref 135–145)
SP GR SPEC: 1.02 — SIGNIFICANT CHANGE UP (ref 1.01–1.02)
THC UR QL: NEGATIVE — SIGNIFICANT CHANGE UP
UROBILINOGEN FLD QL: NEGATIVE MG/DL — SIGNIFICANT CHANGE UP
WBC # BLD: 10.9 K/UL — HIGH (ref 3.8–10.5)
WBC # FLD AUTO: 10.9 K/UL — HIGH (ref 3.8–10.5)
WBC UR QL: SIGNIFICANT CHANGE UP

## 2020-03-11 PROCEDURE — 99291 CRITICAL CARE FIRST HOUR: CPT

## 2020-03-11 PROCEDURE — 93010 ELECTROCARDIOGRAM REPORT: CPT

## 2020-03-11 PROCEDURE — 99233 SBSQ HOSP IP/OBS HIGH 50: CPT

## 2020-03-11 RX ORDER — INSULIN HUMAN 100 [IU]/ML
2 INJECTION, SOLUTION SUBCUTANEOUS
Qty: 100 | Refills: 0 | Status: DISCONTINUED | OUTPATIENT
Start: 2020-03-11 | End: 2020-03-11

## 2020-03-11 RX ORDER — SODIUM BICARBONATE 1 MEQ/ML
50 SYRINGE (ML) INTRAVENOUS ONCE
Refills: 0 | Status: COMPLETED | OUTPATIENT
Start: 2020-03-11 | End: 2020-03-11

## 2020-03-11 RX ORDER — SODIUM CHLORIDE 9 MG/ML
1000 INJECTION, SOLUTION INTRAVENOUS
Refills: 0 | Status: DISCONTINUED | OUTPATIENT
Start: 2020-03-11 | End: 2020-03-11

## 2020-03-11 RX ORDER — SODIUM CHLORIDE 9 MG/ML
2000 INJECTION, SOLUTION INTRAVENOUS ONCE
Refills: 0 | Status: COMPLETED | OUTPATIENT
Start: 2020-03-11 | End: 2020-03-11

## 2020-03-11 RX ORDER — SODIUM CHLORIDE 9 MG/ML
1000 INJECTION, SOLUTION INTRAVENOUS
Refills: 0 | Status: DISCONTINUED | OUTPATIENT
Start: 2020-03-11 | End: 2020-03-13

## 2020-03-11 RX ORDER — INSULIN LISPRO 100/ML
VIAL (ML) SUBCUTANEOUS
Refills: 0 | Status: DISCONTINUED | OUTPATIENT
Start: 2020-03-11 | End: 2020-03-13

## 2020-03-11 RX ORDER — DEXTROSE 50 % IN WATER 50 %
25 SYRINGE (ML) INTRAVENOUS ONCE
Refills: 0 | Status: DISCONTINUED | OUTPATIENT
Start: 2020-03-11 | End: 2020-03-13

## 2020-03-11 RX ORDER — DEXTROSE 50 % IN WATER 50 %
15 SYRINGE (ML) INTRAVENOUS ONCE
Refills: 0 | Status: DISCONTINUED | OUTPATIENT
Start: 2020-03-11 | End: 2020-03-13

## 2020-03-11 RX ORDER — GLUCAGON INJECTION, SOLUTION 0.5 MG/.1ML
1 INJECTION, SOLUTION SUBCUTANEOUS ONCE
Refills: 0 | Status: DISCONTINUED | OUTPATIENT
Start: 2020-03-11 | End: 2020-03-13

## 2020-03-11 RX ORDER — INSULIN GLARGINE 100 [IU]/ML
30 INJECTION, SOLUTION SUBCUTANEOUS AT BEDTIME
Refills: 0 | Status: DISCONTINUED | OUTPATIENT
Start: 2020-03-11 | End: 2020-03-12

## 2020-03-11 RX ORDER — ONDANSETRON 8 MG/1
4 TABLET, FILM COATED ORAL ONCE
Refills: 0 | Status: COMPLETED | OUTPATIENT
Start: 2020-03-11 | End: 2020-03-11

## 2020-03-11 RX ORDER — ENOXAPARIN SODIUM 100 MG/ML
40 INJECTION SUBCUTANEOUS DAILY
Refills: 0 | Status: DISCONTINUED | OUTPATIENT
Start: 2020-03-11 | End: 2020-03-11

## 2020-03-11 RX ORDER — SODIUM CHLORIDE 9 MG/ML
1000 INJECTION INTRAMUSCULAR; INTRAVENOUS; SUBCUTANEOUS
Refills: 0 | Status: DISCONTINUED | OUTPATIENT
Start: 2020-03-11 | End: 2020-03-11

## 2020-03-11 RX ORDER — INSULIN LISPRO 100/ML
3 VIAL (ML) SUBCUTANEOUS
Refills: 0 | Status: DISCONTINUED | OUTPATIENT
Start: 2020-03-11 | End: 2020-03-13

## 2020-03-11 RX ORDER — MAGNESIUM SULFATE 500 MG/ML
2 VIAL (ML) INJECTION ONCE
Refills: 0 | Status: COMPLETED | OUTPATIENT
Start: 2020-03-11 | End: 2020-03-11

## 2020-03-11 RX ORDER — ONDANSETRON 8 MG/1
4 TABLET, FILM COATED ORAL EVERY 6 HOURS
Refills: 0 | Status: DISCONTINUED | OUTPATIENT
Start: 2020-03-11 | End: 2020-03-13

## 2020-03-11 RX ORDER — DEXTROSE 50 % IN WATER 50 %
12.5 SYRINGE (ML) INTRAVENOUS ONCE
Refills: 0 | Status: DISCONTINUED | OUTPATIENT
Start: 2020-03-11 | End: 2020-03-13

## 2020-03-11 RX ORDER — INSULIN HUMAN 100 [IU]/ML
10 INJECTION, SOLUTION SUBCUTANEOUS ONCE
Refills: 0 | Status: COMPLETED | OUTPATIENT
Start: 2020-03-11 | End: 2020-03-11

## 2020-03-11 RX ORDER — INSULIN GLARGINE 100 [IU]/ML
30 INJECTION, SOLUTION SUBCUTANEOUS ONCE
Refills: 0 | Status: COMPLETED | OUTPATIENT
Start: 2020-03-11 | End: 2020-03-11

## 2020-03-11 RX ORDER — ACETAMINOPHEN 500 MG
650 TABLET ORAL EVERY 6 HOURS
Refills: 0 | Status: DISCONTINUED | OUTPATIENT
Start: 2020-03-11 | End: 2020-03-13

## 2020-03-11 RX ORDER — POTASSIUM PHOSPHATE, MONOBASIC POTASSIUM PHOSPHATE, DIBASIC 236; 224 MG/ML; MG/ML
30 INJECTION, SOLUTION INTRAVENOUS ONCE
Refills: 0 | Status: COMPLETED | OUTPATIENT
Start: 2020-03-11 | End: 2020-03-11

## 2020-03-11 RX ORDER — INSULIN GLARGINE 100 [IU]/ML
20 INJECTION, SOLUTION SUBCUTANEOUS ONCE
Refills: 0 | Status: DISCONTINUED | OUTPATIENT
Start: 2020-03-11 | End: 2020-03-11

## 2020-03-11 RX ORDER — CHLORHEXIDINE GLUCONATE 213 G/1000ML
1 SOLUTION TOPICAL
Refills: 0 | Status: DISCONTINUED | OUTPATIENT
Start: 2020-03-11 | End: 2020-03-11

## 2020-03-11 RX ADMIN — POTASSIUM PHOSPHATE, MONOBASIC POTASSIUM PHOSPHATE, DIBASIC 83.33 MILLIMOLE(S): 236; 224 INJECTION, SOLUTION INTRAVENOUS at 20:09

## 2020-03-11 RX ADMIN — Medication 50 MILLIEQUIVALENT(S): at 10:30

## 2020-03-11 RX ADMIN — SODIUM CHLORIDE 2000 MILLILITER(S): 9 INJECTION, SOLUTION INTRAVENOUS at 09:43

## 2020-03-11 RX ADMIN — INSULIN GLARGINE 30 UNIT(S): 100 INJECTION, SOLUTION SUBCUTANEOUS at 23:19

## 2020-03-11 RX ADMIN — SODIUM CHLORIDE 200 MILLILITER(S): 9 INJECTION INTRAMUSCULAR; INTRAVENOUS; SUBCUTANEOUS at 12:18

## 2020-03-11 RX ADMIN — ENOXAPARIN SODIUM 40 MILLIGRAM(S): 100 INJECTION SUBCUTANEOUS at 12:18

## 2020-03-11 RX ADMIN — INSULIN HUMAN 10 UNIT(S): 100 INJECTION, SOLUTION SUBCUTANEOUS at 10:38

## 2020-03-11 RX ADMIN — SODIUM CHLORIDE 150 MILLILITER(S): 9 INJECTION, SOLUTION INTRAVENOUS at 15:07

## 2020-03-11 RX ADMIN — INSULIN HUMAN 4 UNIT(S)/HR: 100 INJECTION, SOLUTION SUBCUTANEOUS at 10:38

## 2020-03-11 RX ADMIN — ONDANSETRON 4 MILLIGRAM(S): 8 TABLET, FILM COATED ORAL at 09:43

## 2020-03-11 RX ADMIN — INSULIN HUMAN 4 UNIT(S)/HR: 100 INJECTION, SOLUTION SUBCUTANEOUS at 10:50

## 2020-03-11 RX ADMIN — INSULIN HUMAN 6 UNIT(S)/HR: 100 INJECTION, SOLUTION SUBCUTANEOUS at 12:18

## 2020-03-11 RX ADMIN — Medication 50 MILLIEQUIVALENT(S): at 10:49

## 2020-03-11 RX ADMIN — Medication 50 GRAM(S): at 18:48

## 2020-03-11 RX ADMIN — SODIUM CHLORIDE 200 MILLILITER(S): 9 INJECTION, SOLUTION INTRAVENOUS at 16:07

## 2020-03-11 NOTE — ED ADULT TRIAGE NOTE - BP NONINVASIVE SYSTOLIC (MM HG)
"Foreign Body  When the skin is cut or punctured and some object is left in the tissue under the skin, that object is called a \"foreign body\". A foreign body could be a wood splinter, a thorn, a sliver of metal, a shard of glass, a cactus needle or the tip of a pencil. In most instances, your caregiver will recommend that the foreign body be removed. If it is not removed, infection, abscess formation, an allergic reaction, chronic pain and disability can occur over time.  Sometimes, foreign bodies (particularly very small ones) can be difficult to locate. Your caregiver may recommend x-rays or ultrasound imaging to help find them. If removal is not successful, there may be a need to see a surgeon who might suggest further exploration in the operating room. Occasionally, tiny bits of metallic foreign material (such as shrapnel) are not removed, if it is felt that there would be no harm in leaving them untouched.  HOME CARE INSTRUCTIONS  · Rest the injured area and keep it elevated until all the pain and swelling are gone.   · You will need a tetanus vaccination if you have not had one in the last 5 years.   · Return to this facility, see your caregiver or follow-up as instructed in 2 days.   SEEK IMMEDIATE MEDICAL CARE IF:   · You develop increasing redness or swelling of the skin near the wound.   · You develop drainage of pus from the wound.   · You have persistent pain or loss of motion.   · You have red streaks extending above or below the wound location.   MAKE SURE YOU:   · Understand these instructions.   · Will watch your condition.   · Will get help right away if you are not doing well or get worse.   Document Released: 12/18/2006 Document Revised: 03/11/2013 Document Reviewed: 11/19/2010  [employer]® Patient Information ©2013 [employer].  "
130

## 2020-03-11 NOTE — PATIENT PROFILE ADULT - INSURANCE HELP
Resident Note discussed with  primary attending    Patient is a 84y old  Female who presents with a chief complaint of right rib pain (20 May 2018 04:24)      INTERVAL HPI/OVERNIGHT EVENTS: no new complaints    MEDICATIONS  (STANDING):  dipyridamole 200 mG/aspirin 25 mG 1 Capsule(s) Oral two times a day  donepezil 10 milliGRAM(s) Oral at bedtime  DULoxetine 30 milliGRAM(s) Oral two times a day  enoxaparin Injectable 30 milliGRAM(s) SubCutaneous daily  famotidine    Tablet 20 milliGRAM(s) Oral daily  memantine 10 milliGRAM(s) Oral two times a day  metoprolol succinate ER 50 milliGRAM(s) Oral daily  simvastatin 20 milliGRAM(s) Oral at bedtime    MEDICATIONS  (PRN):  acetaminophen   Tablet 650 milliGRAM(s) Oral every 6 hours PRN mild pain  clonazePAM Tablet 1 milliGRAM(s) Oral three times a day PRN anxiety  traMADol 25 milliGRAM(s) Oral every 4 hours PRN Severe Pain (7 - 10)      __________________________________________________  REVIEW OF SYSTEMS:    CONSTITUTIONAL: No fever,   EYES: no acute visual disturbances  NECK: No pain or stiffness  RESPIRATORY: No cough; No shortness of breath  CARDIOVASCULAR: No chest pain, no palpitations  GASTROINTESTINAL: No pain. No nausea or vomiting; No diarrhea   NEUROLOGICAL: No headache or numbness, no tremors  MUSCULOSKELETAL: No joint pain, no muscle pain  GENITOURINARY: no dysuria, no frequency, no hesitancy  PSYCHIATRY: no depression , no anxiety  ALL OTHER  ROS negative        Vital Signs Last 24 Hrs  T(C): 36.3 (21 May 2018 05:00), Max: 37 (20 May 2018 23:10)  T(F): 97.3 (21 May 2018 05:00), Max: 98.6 (20 May 2018 23:10)  HR: 62 (21 May 2018 05:00) (55 - 64)  BP: 111/72 (21 May 2018 05:00) (110/59 - 146/59)  BP(mean): --  RR: 16 (21 May 2018 05:00) (16 - 17)  SpO2: 98% (21 May 2018 05:00) (97% - 100%)    ________________________________________________  PHYSICAL EXAM:  GENERAL: NAD  HEENT:Normocephalic;  conjunctivae and sclerae clear; moist mucous membranes;   NECK : supple  CHEST/LUNG: Clear to auscuitation bilaterally with good air entry   HEART: S1 S2  regular; no murmurs, gallops or rubs  ABDOMEN: Soft, Nontender, Nondistended; Bowel sounds present  EXTREMITIES: no cyanosis; no edema; no calf tenderness  NERVOUS SYSTEM:  Awake and alert; Oriented  to place, person and time ; no new deficits    _________________________________________________  LABS:                        12.3   7.3   )-----------( 318      ( 20 May 2018 02:49 )             39.9     05-20    140  |  104  |  17  ----------------------------<  82  4.2   |  34<H>  |  1.13    Ca    9.3      20 May 2018 02:50    TPro  7.1  /  Alb  3.3<L>  /  TBili  0.3  /  DBili  x   /  AST  17  /  ALT  18  /  AlkPhos  100  05-20        CAPILLARY BLOOD GLUCOSE            RADIOLOGY & ADDITIONAL TESTS:    Imaging Personally Reviewed:  YES/NO    Consultant(s) Notes Reviewed:   YES/ No    Care Discussed with Consultants :     Plan of care was discussed with patient and /or primary care giver; all questions and concerns were addressed and care was aligned with patient's wishes. Resident Note discussed with  primary attending    Patient is a 84y old  Female who presents with a chief complaint of right rib pain (20 May 2018 04:24)      INTERVAL HPI/OVERNIGHT EVENTS: no new complaints overnight. This AM patient is lying comfortably  in bed, not in distress. c/o pain over Rt lower subcostal region    MEDICATIONS  (STANDING):  dipyridamole 200 mG/aspirin 25 mG 1 Capsule(s) Oral two times a day  donepezil 10 milliGRAM(s) Oral at bedtime  DULoxetine 30 milliGRAM(s) Oral two times a day  enoxaparin Injectable 30 milliGRAM(s) SubCutaneous daily  famotidine    Tablet 20 milliGRAM(s) Oral daily  memantine 10 milliGRAM(s) Oral two times a day  metoprolol succinate ER 50 milliGRAM(s) Oral daily  simvastatin 20 milliGRAM(s) Oral at bedtime    MEDICATIONS  (PRN):  acetaminophen   Tablet 650 milliGRAM(s) Oral every 6 hours PRN mild pain  clonazePAM Tablet 1 milliGRAM(s) Oral three times a day PRN anxiety  traMADol 25 milliGRAM(s) Oral every 4 hours PRN Severe Pain (7 - 10)      __________________________________________________  REVIEW OF SYSTEMS:    CONSTITUTIONAL: No fever  RESPIRATORY: No cough  GASTROINTESTINAL: No pain. No nausea or vomiting; No diarrhea   MUSCULOSKELETAL: pain over Rt subcostal region  limited ROS due to patient's clinical status        Vital Signs Last 24 Hrs  T(C): 36.3 (21 May 2018 05:00), Max: 37 (20 May 2018 23:10)  T(F): 97.3 (21 May 2018 05:00), Max: 98.6 (20 May 2018 23:10)  HR: 62 (21 May 2018 05:00) (55 - 64)  BP: 111/72 (21 May 2018 05:00) (110/59 - 146/59)  BP(mean): --  RR: 16 (21 May 2018 05:00) (16 - 17)  SpO2: 98% (21 May 2018 05:00) (97% - 100%)    ________________________________________________  PHYSICAL EXAM:  GENERAL: patient is lying comfortably  in bed, not in distress  HEENT:Normocephalic;  conjunctivae and sclerae clear; moist mucous membranes;   NECK : supple  CHEST/LUNG: Clear to auscultation bilaterally with good air entry   HEART: S1 S2  regular; murmur+  ABDOMEN: Soft, Nontender, Nondistended; Bowel sounds present  EXTREMITIES: no cyanosis; no edema; no calf tenderness, tenderness noted over Rt lower rib cage  NERVOUS SYSTEM:  Awake and alert; disoriented    _________________________________________________  LABS:                                     12.3   7.2   )-----------( 307      ( 21 May 2018 07:32 )             39.6       05-21    139  |  102  |  17  ----------------------------<  84  4.2   |  32<H>  |  1.10    Ca    9.3      21 May 2018 07:32    TPro  7.1  /  Alb  3.3<L>  /  TBili  0.3  /  DBili  x   /  AST  17  /  ALT  18  /  AlkPhos  100  05-20          RADIOLOGY & ADDITIONAL TESTS: NO NEW IMAGING STUDIES PERFORMED TODAY.    Care Discussed with Consultants :     Plan of care was discussed with patient and /or primary care giver; all questions and concerns were addressed and care was aligned with patient's wishes. no

## 2020-03-11 NOTE — ED PROVIDER NOTE - OBJECTIVE STATEMENT
18 y/o female hx IDDM, dka in past, present with several days of worsening vomiting, tachypnea similar to prior DKA. States has been taking her humalog, doesn't have her lantus because of insurance issues. Denies f/c, uri symptoms, cp, abd pain, chance of preg (LMP last week), vag bleeding, dysuria or headache. No recent travel.     ROS: No fever/chills. No eye pain/changes in vision, No ear pain/sore throat/dysphagia, No chest pain/palpitations. No cough/. No abdominal pain, D, no black/bloody bm. No dysuria/frequency/discharge, No headache. No Dizziness.    No rashes or breaks in skin. No numbness/tingling/weakness.

## 2020-03-11 NOTE — ED ADULT NURSE REASSESSMENT NOTE - NS ED NURSE REASSESS COMMENT FT1
Spoke with ICU PA regarding BW and as per PA "increase insulin drip to 6units/hr", adjusted as per orders, will continue to monitor for safety and comfort

## 2020-03-11 NOTE — H&P ADULT - PROBLEM SELECTOR PLAN 2
Hemolysis noted in the samplings,   Creatnine is only 1.1  Received Insulin and 2 Amps of Bicarb.  Will follow-up BMP

## 2020-03-11 NOTE — H&P ADULT - PROBLEM SELECTOR PLAN 3
Aggressive re-hydration   already had 2 liter os LR  Will continue with IV NS @ 200/H given hyperkalemia

## 2020-03-11 NOTE — ED PROVIDER NOTE - PHYSICAL EXAMINATION
Gen: tachypneic, uncomfortable, moderate distress  HEENT: Mucous membranes moist, pink conjunctivae, EOMI  CV: tachy, nl s1/s2.  Resp: CTAB, tachypneic  GI: Abdomen soft, NT, ND. No rebound, no guarding  : No CVAT  Neuro: A&O x 3, moving all 4 extremities  MSK: No spine or joint tenderness to palpation  Skin: No rashes. intact and perfused.

## 2020-03-11 NOTE — ED ADULT NURSE NOTE - OBJECTIVE STATEMENT
Pt c/o generalized body pain x's 3 days and pt  reports pt vomiting x's 3 days, pt type 1 DM and reports unable to take "nighttime insulin for a week", pt with labored tachypneic breathing, vomiting upon arrival

## 2020-03-11 NOTE — ED PROVIDER NOTE - CLINICAL SUMMARY MEDICAL DECISION MAKING FREE TEXT BOX
pt with likely dka, tachypneic/vomiting/tachycardic, not taking lantus. labs, blood gas, LR, insulin after k results, likely icu consult, reassess.

## 2020-03-11 NOTE — H&P ADULT - NSICDXPASTSURGICALHX_GEN_ALL_CORE_FT
PAST SURGICAL HISTORY:  S/P      S/P dilatation and curettage miscarriage x 2    S/P tonsillectomy and adenoidectomy     S/P tonsillectomy and adenoidectomy

## 2020-03-11 NOTE — ED ADULT TRIAGE NOTE - CHIEF COMPLAINT QUOTE
"I am in DKA", patient states that she has juvenile diabetes and has been having N/V at home. Pt states that she takes insulin at home. Patient having increased respirations in triage.

## 2020-03-11 NOTE — ED ADULT NURSE NOTE - PSH
S/P     S/P dilatation and curettage  miscarriage x 2  S/P tonsillectomy and adenoidectomy    S/P tonsillectomy and adenoidectomy

## 2020-03-11 NOTE — H&P ADULT - PROBLEM SELECTOR PLAN 1
Related to Non-compliance  Will aggressively rehydrate and treat with insulin infusion until Anion Gap closes. then transition to Long Acting Insulin  Serial BMP and replacement of electrolytes as necessary and Follow Hyperkalemia.   Diabetic teaching Endocrine evaluation   Hourly Accuchecks  Will check formal ABG

## 2020-03-11 NOTE — PROGRESS NOTE ADULT - SUBJECTIVE AND OBJECTIVE BOX
***[facility] DOWNGRADE ACCEPTANCE NOTE TO HOSPITALIST SERVICE***    CC: dka    Patient seen and examined at the bedside. No acute overnight events. admitted yesterday. Complaining of mild odynophagia today. Denies fever/chills, headache, lightheadedness, dizziness, chest pain, palpitations, shortness of breath, cough, abd pain, nausea/vomiting/diarrhea, muscle pain.      =========================================================================================  T(C): 36.5 (20 @ 19:31), Max: 36.8 (20 @ 12:30)  HR: 111 (20 @ 01:00) (102 - 130)  BP: 100/55 (20 @ 01:00) (88/65 - 148/85)  RR: 23 (20 @ 01:00) (15 - 32)  SpO2: 100% (20 @ 01:00) (99% - 100%)    PHYSICAL EXAM.    GEN - appears younger than age. thin. no distress.   HEENT - NCAT, EOMI, MERCY  RESP - CTA BL, no wheeze/stridor/rhonchi/crackles. not on supplemental O2. able to speak in full sentences without distress.   CARDIO - NS1S2, RRR. No murmurs/rubs/gallops.  ABD - Soft/Non tender/Non distended. Normal BS x4 quadrants. no guarding/rebound tenderness.  Ext - No TARIQ.  MSK - BL 5/5 strength on upper and lower extremities.   Neuro - AAOx3. cn 2-12 grossly intact  Psych - normal affect  Skin - c/d/i. no rashes/lesions      I&O's Summary    11 Mar 2020 07:01  -  12 Mar 2020 02:04  --------------------------------------------------------  IN: 3976.8 mL / OUT: 1520 mL / NET: 2456.8 mL      Daily Height in cm: 152.4 (11 Mar 2020 12:30)    Daily     =========================================================================================  LABS.        137  |  101  |  11.0  ----------------------------<  112<H>  5.9<H>   |  19.0<L>  |  0.85    Ca    8.4<L>      11 Mar 2020 21:56  Phos  1.8       Mg     1.7         TPro  6.7  /  Alb  3.8  /  TBili  0.2<L>  /  DBili  x   /  AST  50<H>  /  ALT  39<H>  /  AlkPhos  124<H>                            15.6   10.90 )-----------( 445      ( 11 Mar 2020 09:54 )             51.1     LIVER FUNCTIONS - ( 11 Mar 2020 16:39 )  Alb: 3.8 g/dL / Pro: 6.7 g/dL / ALK PHOS: 124 U/L / ALT: 39 U/L / AST: 50 U/L / GGT: x                 Urinalysis Basic - ( 11 Mar 2020 10:56 )    Color: Yellow / Appearance: Clear / S.020 / pH: x  Gluc: x / Ketone: Large  / Bili: Negative / Urobili: Negative mg/dL   Blood: x / Protein: 30 mg/dL / Nitrite: Negative   Leuk Esterase: Negative / RBC: 0-2 /HPF / WBC 3-5   Sq Epi: x / Non Sq Epi: Occasional / Bacteria: Negative          =========================================================================================  IMAGING.     =========================================================================================    DIET.    Diet, Consistent Carbohydrate w/Evening Snack (20 @ 22:53)      =========================================================================================    HOME MEDS.    Home Medications:  NovoLOG:  (11 Mar 2020 11:03)      =========================================================================================    HOSPITAL MEDS.    MEDICATIONS  (STANDING):  dextrose 5%. 1000 milliLiter(s) (50 mL/Hr) IV Continuous <Continuous>  dextrose 50% Injectable 12.5 Gram(s) IV Push once  dextrose 50% Injectable 25 Gram(s) IV Push once  dextrose 50% Injectable 25 Gram(s) IV Push once  insulin glargine Injectable (LANTUS) 30 Unit(s) SubCutaneous at bedtime  insulin lispro (HumaLOG) corrective regimen sliding scale   SubCutaneous three times a day before meals  insulin lispro Injectable (HumaLOG) 3 Unit(s) SubCutaneous three times a day before meals    MEDICATIONS  (PRN):  acetaminophen   Tablet .. 650 milliGRAM(s) Oral every 6 hours PRN Temp greater or equal to 38C (100.4F), Mild Pain (1 - 3)  dextrose 40% Gel 15 Gram(s) Oral once PRN Blood Glucose LESS THAN 70 milliGRAM(s)/deciLiter  glucagon  Injectable 1 milliGRAM(s) IntraMuscular once PRN Glucose <70 milliGRAM(s)/deciLiter  ondansetron Injectable 4 milliGRAM(s) IV Push every 6 hours PRN Nausea and/or Vomiting

## 2020-03-11 NOTE — PATIENT PROFILE ADULT - BILL PAYMENT
Dermal Autograft Text: The defect edges were debeveled with a #15 scalpel blade.  Given the location of the defect, shape of the defect and the proximity to free margins a dermal autograft was deemed most appropriate.  Using a sterile surgical marker, the primary defect shape was transferred to the donor site. The area thus outlined was incised deep to adipose tissue with a #15 scalpel blade.  The harvested graft was then trimmed of adipose and epidermal tissue until only dermis was left.  The skin graft was then placed in the primary defect and oriented appropriately. no

## 2020-03-11 NOTE — H&P ADULT - HISTORY OF PRESENT ILLNESS
19F with known history of IDDM, who is non-compliant with her care and previous admissions for DKA.. Now presents with a few days of not feeling well. She states she hasnt had the Lantus in a while. In the ER she was found to have a BG >700 with pH of 7.0 and an Anion Gap of >37. She was started bolused with IV fluids, Given 2 amps of Bicarb IVP and was started on an insulin infusion.

## 2020-03-11 NOTE — PROGRESS NOTE ADULT - ASSESSMENT
19yof w/ pmh dm1 on insulin presenting w/ dka. admitted to [facility]. downgraded to hospitalist floors 3/11/20.  #dm1 on insulin complicated by DKA, stable - hx of mult ep dka prompting hospitalization in past. DKA due to med non adherence - insurance barriers precluding lantus. a1c 13 in 12/2019, uncontrolled. nutri eval pending. diabetic nurse educator eval pending. iss + fs achs. premeal insulin. lantus started by [facility]. transition to 70/30 prior to dc if lantus confirmed to not be covered. outpt fu w/ Dr Kessler  #hyperkalemia, mild, asymptomatic - initially resolved, now recurrent. trend.  #hypophosphatemia, moderate, asymptomatic - sp iv repletion. repeat pending collection.  dvt ppx. low risk. scd    dispo. pending stability of BG and electrolytes    outpt fu. pmd. endo Dr Kessler 19yof w/ pmh dm1 on insulin presenting w/ dka. admitted to [facility]. downgraded to hospitalist floors 3/11/20.  #dm1 on insulin complicated by DKA, stable - hx of mult ep dka prompting hospitalization in past. DKA due to med non adherence - insurance barriers precluding lantus. a1c 13 in 12/2019, uncontrolled. sp insulin drip in [facility], downgraded 3/11/20. nutri eval pending. diabetic nurse educator eval pending. iss + fs achs. premeal insulin. lantus started by [facility]. transition to 70/30 prior to dc if confirmed that lantus is no longer covered - per pt each pen $600. outpt fu w/ Dr Kessler - pt unable to go to follow up appt from last admit due to her child being ill.  #hyperkalemia, mild, asymptomatic - initially resolved, now recurrent. trend.  #hypophosphatemia, moderate, asymptomatic - sp iv repletion. repeat pending collection.  dvt ppx. low risk. scd    dispo. pending stability of BG and electrolytes    outpt fu. pmd. endo Dr Kessler

## 2020-03-11 NOTE — ED PROVIDER NOTE - PROGRESS NOTE DETAILS
Laurie: [facility] seeing pt, getting bicarb/insulin. ekg. Laurie: pt accepted by [facility]

## 2020-03-11 NOTE — H&P ADULT - ASSESSMENT
19F with IDDM with known non-compliance and previous DKA episodes. Now presents with severe DKA and dehydration.     A total of 40 mins was spent evaluating and treating this critical patient, including speaking with the patient and staff

## 2020-03-11 NOTE — H&P ADULT - ATTENDING COMMENTS
19F w/ PMHx DM who non-compliant with meds admitted w/ DKA, pH 7.0, CO2 ~ 6 w/ gap > 35 and K 6.5. Given bicarb push and started on aggressive hydration and Insulin gtt. Will need to trend BMP. Insulin infusion, better glucose control and hydration will likely decrease K levels. EKG

## 2020-03-11 NOTE — ED ADULT NURSE REASSESSMENT NOTE - NS ED NURSE REASSESS COMMENT FT1
ICU PA at bedside for eval, pt tachypneic and anxious, emotional support provided, education provided, pt showing sinus tach on monitor

## 2020-03-12 ENCOUNTER — TRANSCRIPTION ENCOUNTER (OUTPATIENT)
Age: 20
End: 2020-03-12

## 2020-03-12 LAB
ANION GAP SERPL CALC-SCNC: 15 MMOL/L — SIGNIFICANT CHANGE UP (ref 5–17)
ANION GAP SERPL CALC-SCNC: 19 MMOL/L — HIGH (ref 5–17)
BASOPHILS # BLD AUTO: 0.03 K/UL — SIGNIFICANT CHANGE UP (ref 0–0.2)
BASOPHILS NFR BLD AUTO: 0.4 % — SIGNIFICANT CHANGE UP (ref 0–2)
BUN SERPL-MCNC: 8 MG/DL — SIGNIFICANT CHANGE UP (ref 8–20)
BUN SERPL-MCNC: 8 MG/DL — SIGNIFICANT CHANGE UP (ref 8–20)
CALCIUM SERPL-MCNC: 7.9 MG/DL — LOW (ref 8.6–10.2)
CALCIUM SERPL-MCNC: 8.3 MG/DL — LOW (ref 8.6–10.2)
CHLORIDE SERPL-SCNC: 102 MMOL/L — SIGNIFICANT CHANGE UP (ref 98–107)
CHLORIDE SERPL-SCNC: 103 MMOL/L — SIGNIFICANT CHANGE UP (ref 98–107)
CO2 SERPL-SCNC: 16 MMOL/L — LOW (ref 22–29)
CO2 SERPL-SCNC: 18 MMOL/L — LOW (ref 22–29)
CREAT SERPL-MCNC: 0.71 MG/DL — SIGNIFICANT CHANGE UP (ref 0.5–1.3)
CREAT SERPL-MCNC: 0.74 MG/DL — SIGNIFICANT CHANGE UP (ref 0.5–1.3)
EOSINOPHIL # BLD AUTO: 0.05 K/UL — SIGNIFICANT CHANGE UP (ref 0–0.5)
EOSINOPHIL NFR BLD AUTO: 0.6 % — SIGNIFICANT CHANGE UP (ref 0–6)
GLUCOSE BLDC GLUCOMTR-MCNC: 103 MG/DL — HIGH (ref 70–99)
GLUCOSE BLDC GLUCOMTR-MCNC: 105 MG/DL — HIGH (ref 70–99)
GLUCOSE BLDC GLUCOMTR-MCNC: 107 MG/DL — HIGH (ref 70–99)
GLUCOSE BLDC GLUCOMTR-MCNC: 108 MG/DL — HIGH (ref 70–99)
GLUCOSE BLDC GLUCOMTR-MCNC: 109 MG/DL — HIGH (ref 70–99)
GLUCOSE BLDC GLUCOMTR-MCNC: 123 MG/DL — HIGH (ref 70–99)
GLUCOSE BLDC GLUCOMTR-MCNC: 126 MG/DL — HIGH (ref 70–99)
GLUCOSE BLDC GLUCOMTR-MCNC: 146 MG/DL — HIGH (ref 70–99)
GLUCOSE BLDC GLUCOMTR-MCNC: 209 MG/DL — HIGH (ref 70–99)
GLUCOSE BLDC GLUCOMTR-MCNC: 67 MG/DL — LOW (ref 70–99)
GLUCOSE BLDC GLUCOMTR-MCNC: 76 MG/DL — SIGNIFICANT CHANGE UP (ref 70–99)
GLUCOSE BLDC GLUCOMTR-MCNC: 80 MG/DL — SIGNIFICANT CHANGE UP (ref 70–99)
GLUCOSE SERPL-MCNC: 123 MG/DL — HIGH (ref 70–99)
GLUCOSE SERPL-MCNC: 217 MG/DL — HIGH (ref 70–99)
HBA1C BLD-MCNC: 12 % — HIGH (ref 4–5.6)
HCT VFR BLD CALC: 38.6 % — SIGNIFICANT CHANGE UP (ref 34.5–45)
HGB BLD-MCNC: 12.8 G/DL — SIGNIFICANT CHANGE UP (ref 11.5–15.5)
IMM GRANULOCYTES NFR BLD AUTO: 0.6 % — SIGNIFICANT CHANGE UP (ref 0–1.5)
LYMPHOCYTES # BLD AUTO: 2.7 K/UL — SIGNIFICANT CHANGE UP (ref 1–3.3)
LYMPHOCYTES # BLD AUTO: 31.5 % — SIGNIFICANT CHANGE UP (ref 13–44)
MAGNESIUM SERPL-MCNC: 2.3 MG/DL — SIGNIFICANT CHANGE UP (ref 1.6–2.6)
MCHC RBC-ENTMCNC: 30.2 PG — SIGNIFICANT CHANGE UP (ref 27–34)
MCHC RBC-ENTMCNC: 33.2 GM/DL — SIGNIFICANT CHANGE UP (ref 32–36)
MCV RBC AUTO: 91 FL — SIGNIFICANT CHANGE UP (ref 80–100)
MONOCYTES # BLD AUTO: 0.95 K/UL — HIGH (ref 0–0.9)
MONOCYTES NFR BLD AUTO: 11.1 % — SIGNIFICANT CHANGE UP (ref 2–14)
NEUTROPHILS # BLD AUTO: 4.79 K/UL — SIGNIFICANT CHANGE UP (ref 1.8–7.4)
NEUTROPHILS NFR BLD AUTO: 55.8 % — SIGNIFICANT CHANGE UP (ref 43–77)
PHOSPHATE SERPL-MCNC: 2.8 MG/DL — SIGNIFICANT CHANGE UP (ref 2.4–4.7)
PLATELET # BLD AUTO: 343 K/UL — SIGNIFICANT CHANGE UP (ref 150–400)
POTASSIUM SERPL-MCNC: 3.8 MMOL/L — SIGNIFICANT CHANGE UP (ref 3.5–5.3)
POTASSIUM SERPL-MCNC: 4 MMOL/L — SIGNIFICANT CHANGE UP (ref 3.5–5.3)
POTASSIUM SERPL-SCNC: 3.8 MMOL/L — SIGNIFICANT CHANGE UP (ref 3.5–5.3)
POTASSIUM SERPL-SCNC: 4 MMOL/L — SIGNIFICANT CHANGE UP (ref 3.5–5.3)
RBC # BLD: 4.24 M/UL — SIGNIFICANT CHANGE UP (ref 3.8–5.2)
RBC # FLD: 13.1 % — SIGNIFICANT CHANGE UP (ref 10.3–14.5)
SODIUM SERPL-SCNC: 135 MMOL/L — SIGNIFICANT CHANGE UP (ref 135–145)
SODIUM SERPL-SCNC: 138 MMOL/L — SIGNIFICANT CHANGE UP (ref 135–145)
WBC # BLD: 8.57 K/UL — SIGNIFICANT CHANGE UP (ref 3.8–10.5)
WBC # FLD AUTO: 8.57 K/UL — SIGNIFICANT CHANGE UP (ref 3.8–10.5)

## 2020-03-12 PROCEDURE — 99223 1ST HOSP IP/OBS HIGH 75: CPT

## 2020-03-12 PROCEDURE — 99233 SBSQ HOSP IP/OBS HIGH 50: CPT

## 2020-03-12 RX ORDER — SODIUM CHLORIDE 9 MG/ML
1000 INJECTION INTRAMUSCULAR; INTRAVENOUS; SUBCUTANEOUS
Refills: 0 | Status: DISCONTINUED | OUTPATIENT
Start: 2020-03-12 | End: 2020-03-13

## 2020-03-12 RX ORDER — BENZOCAINE AND MENTHOL 5; 1 G/100ML; G/100ML
1 LIQUID ORAL EVERY 4 HOURS
Refills: 0 | Status: DISCONTINUED | OUTPATIENT
Start: 2020-03-12 | End: 2020-03-13

## 2020-03-12 RX ORDER — INSULIN GLARGINE 100 [IU]/ML
24 INJECTION, SOLUTION SUBCUTANEOUS AT BEDTIME
Refills: 0 | Status: DISCONTINUED | OUTPATIENT
Start: 2020-03-12 | End: 2020-03-12

## 2020-03-12 RX ORDER — INSULIN GLARGINE 100 [IU]/ML
28 INJECTION, SOLUTION SUBCUTANEOUS AT BEDTIME
Refills: 0 | Status: DISCONTINUED | OUTPATIENT
Start: 2020-03-12 | End: 2020-03-13

## 2020-03-12 RX ADMIN — Medication 3 UNIT(S): at 07:42

## 2020-03-12 RX ADMIN — Medication 3 UNIT(S): at 16:21

## 2020-03-12 RX ADMIN — SODIUM CHLORIDE 100 MILLILITER(S): 9 INJECTION INTRAMUSCULAR; INTRAVENOUS; SUBCUTANEOUS at 10:48

## 2020-03-12 RX ADMIN — Medication 4: at 16:21

## 2020-03-12 RX ADMIN — INSULIN GLARGINE 28 UNIT(S): 100 INJECTION, SOLUTION SUBCUTANEOUS at 23:46

## 2020-03-12 NOTE — DISCHARGE NOTE PROVIDER - CARE PROVIDER_API CALL
Addis Conley)  Internal Medicine  [address] [place] [address]  Phone: [phone]  Fax: [phone]  Follow Up Time: Kriss Mackenzie)  Pediatrics  [address] [place] [address]  Phone: [phone]  Fax: [phone]  Follow Up Time:     Inga Freeman)  Endocrinology[facility]ab[facility]abetes  [address], [place] [id number]  Phone: [phone]  Fax: [phone]  Follow Up Time:

## 2020-03-12 NOTE — DISCHARGE NOTE PROVIDER - NSDCMRMEDTOKEN_GEN_ALL_CORE_FT
Lantus 100 units/mL subcutaneous solution: 30 unit(s) subcutaneous once a day (at bedtime)   NovoLOG: HumaLOG KwikPen 200 units/mL (Concentrated) subcutaneous solution: 3 unit(s) subcutaneous 3 times a day (before meals).   Also continue sliding scale.   Lancet: Use 3 times a day to check blood glucose.  Diagnosis: Uncontrolled DM  ICD: E11.65  Lantus Solostar Pen 100 units/mL subcutaneous solution: 24 unit(s) subcutaneous once a day (at bedtime)   Pen needles: Use 3 times a day for insulin administration.  Diagnosis: Uncontrolled DM  ICD: E11.65  Test Strips: Use 3 times a day to check blood glucose.  Diagnosis: Uncontrolled DM  ICD: E11.65 Basaglar KwikPen 100 units/mL subcutaneous solution: 24 unit(s) subcutaneous once a day (at bedtime)   Lancet (Freestyle): Use 3 times a day to check blood glucose.  Diagnosis: Uncontrolled DM  ICD: E11.65  NovoLOG FlexPen 100 units/mL injectable solution: 3 unit(s) subcutaneous 3 times a day (before meals).  Also continue sliding scale before meals.   Pen needles: Use 3 times a day for insulin administration.  Diagnosis: Uncontrolled DM  ICD: E11.65  Test Strips (Freestyle): Use 3 times a day to check blood glucose.  Diagnosis: Uncontrolled DM  ICD: E11.65

## 2020-03-12 NOTE — CONSULT NOTE ADULT - ASSESSMENT
19F w/ T1DM with recurrent admissions for noncompliance and DKA here with few day history of not feeling well, found to be in DKA due to not taking Lantus. In the ER she was found to have a BG >700 with pH of 7.0 and an Anion Gap of >37.     DKA- resolved. due to noncompliance, s/p insulin gtt    Uncontrolled T1DM- hyperglycemic  -A1c 12  -check sugars AC and bedtime  -ensure diabetic diet  -never hold basal insulin/lantus, otherwise DKA can result  -recommend the following     decrease lantus to 24 units     keep lispro 3 units TID     continue insulin sliding scale    Hypocalcemia- mild, check PTH and vitamin D. monitor for now 19F w/ T1DM with recurrent admissions for noncompliance and DKA here with few day history of not feeling well, found to be in DKA due to not taking Lantus. In the ER she was found to have a BG >700 with pH of 7.0 and an Anion Gap of >37.     DKA- resolved. due to noncompliance, s/p insulin gtt    Uncontrolled T1DM- hyperglycemic  -A1c 12  -check sugars AC and bedtime  -ensure diabetic diet  -never hold basal insulin/lantus, otherwise DKA can result  -recommend the following     decrease lantus to 28 units     keep lispro 3 units TID     continue insulin sliding scale    Hypoglycemia- probably due to not eating much. mild. monitor for now. keep insulin doses the same    Hypocalcemia- mild, check PTH and vitamin D. monitor for now

## 2020-03-12 NOTE — DISCHARGE NOTE PROVIDER - NSDCCPCAREPLAN_GEN_ALL_CORE_FT
PRINCIPAL DISCHARGE DIAGNOSIS  Diagnosis: DKA (diabetic ketoacidoses)  Assessment and Plan of Treatment: A1c 12. Continue with meds as directed. Follow up with your primary doctor & endocrinologist within 1 week of discharge

## 2020-03-12 NOTE — PROGRESS NOTE ADULT - ASSESSMENT
19yof w/ pmh dm1 on insulin presenting w/ dka. admitted to micu. downgraded to hospitalist floors 3/11/20.  #DM type 1 on insulin complicated by DKA, stable - hx of mult ep dka prompting hospitalization in past. DKA due to med non adherence - insurance barriers precluding lantus. a1c 13 in 12/2019, uncontrolled. sp insulin drip in MICU, downgraded 3/11/20. nutri eval pending. diabetic nurse educator eval pending. iss + fs achs. premeal insulin. lantus started by micu. Lantus 28 & lispro 3 for now per endocrine.  transition to 70/30 prior to dc if confirmed that lantus is no longer covered - per pt each pen $600. outpt fu w/ Dr Kessler . Pt not been eating much. Encouraged to eat and counseled about gap re-opening. Endocrine consult appreciated  #hyperkalemia, mild, asymptomatic - initially resolved, now recurrent. trend.  #hypophosphatemia, moderate, asymptomatic - sp iv repletion. repeat pending collection.  #Hypocalcemia- mild, check PTH and vitamin D. monitor for now  dvt ppx. low risk. scd    dispo. pending stability of BG and electrolytes    outpt fu. pmd. endo Dr Kessler

## 2020-03-12 NOTE — PROGRESS NOTE ADULT - SUBJECTIVE AND OBJECTIVE BOX
CHIEF COMPLAINT/INTERVAL HISTORY:    Patient is a 19y old  Female who presents with a chief complaint of DKA (12 Mar 2020 12:10)      HPI:  19F with known history of IDDM, who is non-compliant with her care and previous admissions for DKA.. Now presents with a few days of not feeling well. She states she hasnt had the Lantus in a while. In the ER she was found to have a BG >700 with pH of 7.0 and an Anion Gap of >37. She was started bolused with IV fluids, Given 2 amps of Bicarb IVP and was started on an insulin infusion. (11 Mar 2020 11:28)      SUBJECTIVE & OBJECTIVE/ ROS: Pt seen and examined at bedside. Not eating much because does not like the hospital food. No chest pain, palpitations, sob, light headedness/dizziness, difficulty breathing/cough, fevers/chills, abdominal pain, n/v, diarrhea/constipation, dysuria or increased urinary frequency.     ICU Vital Signs Last 24 Hrs  T(C): 36.8 (12 Mar 2020 12:03), Max: 36.8 (12 Mar 2020 12:03)  T(F): 98.3 (12 Mar 2020 12:03), Max: 98.3 (12 Mar 2020 12:03)  HR: 90 (12 Mar 2020 12:00) (75 - 111)  BP: 103/64 (12 Mar 2020 12:00) (88/65 - 118/76)  BP(mean): 79 (12 Mar 2020 12:00) (70 - 94)  ABP: --  ABP(mean): --  RR: 19 (12 Mar 2020 12:00) (13 - 23)  SpO2: 100% (12 Mar 2020 12:00) (100% - 100%)        MEDICATIONS  (STANDING):  dextrose 5%. 1000 milliLiter(s) (50 mL/Hr) IV Continuous <Continuous>  dextrose 50% Injectable 12.5 Gram(s) IV Push once  dextrose 50% Injectable 25 Gram(s) IV Push once  dextrose 50% Injectable 25 Gram(s) IV Push once  insulin glargine Injectable (LANTUS) 28 Unit(s) SubCutaneous at bedtime  insulin lispro (HumaLOG) corrective regimen sliding scale   SubCutaneous three times a day before meals  insulin lispro Injectable (HumaLOG) 3 Unit(s) SubCutaneous three times a day before meals  sodium chloride 0.9%. 1000 milliLiter(s) (100 mL/Hr) IV Continuous <Continuous>    MEDICATIONS  (PRN):  acetaminophen   Tablet .. 650 milliGRAM(s) Oral every 6 hours PRN Temp greater or equal to 38C (100.4F), Mild Pain (1 - 3)  benzocaine 15 mG/menthol 3.6 mG (Sugar-Free) Lozenge 1 Lozenge Oral every 4 hours PRN Sore Throat  dextrose 40% Gel 15 Gram(s) Oral once PRN Blood Glucose LESS THAN 70 milliGRAM(s)/deciLiter  glucagon  Injectable 1 milliGRAM(s) IntraMuscular once PRN Glucose <70 milliGRAM(s)/deciLiter  ondansetron Injectable 4 milliGRAM(s) IV Push every 6 hours PRN Nausea and/or Vomiting      LABS:                        12.8   8.57  )-----------( 343      ( 12 Mar 2020 06:04 )             38.6     03-12    135  |  102  |  8.0  ----------------------------<  217<H>  3.8   |  18.0<L>  |  0.74    Ca    7.9<L>      12 Mar 2020 14:56  Phos  2.8     03-12  Mg     2.3     -12    TPro  6.7  /  Alb  3.8  /  TBili  0.2<L>  /  DBili  x   /  AST  50<H>  /  ALT  39<H>  /  AlkPhos  124<H>  03-11      Urinalysis Basic - ( 11 Mar 2020 10:56 )    Color: Yellow / Appearance: Clear / S.020 / pH: x  Gluc: x / Ketone: Large  / Bili: Negative / Urobili: Negative mg/dL   Blood: x / Protein: 30 mg/dL / Nitrite: Negative   Leuk Esterase: Negative / RBC: 0-2 /HPF / WBC 3-5   Sq Epi: x / Non Sq Epi: Occasional / Bacteria: Negative        CAPILLARY BLOOD GLUCOSE  126 (12 Mar 2020 06:40)  80 (12 Mar 2020 00:00)  105 (11 Mar 2020 23:00)  107 (11 Mar 2020 22:00)  109 (11 Mar 2020 21:00)  146 (11 Mar 2020 20:00)  123 (11 Mar 2020 19:30)      POCT Blood Glucose.: 103 mg/dL (12 Mar 2020 12:03)  POCT Blood Glucose.: 67 mg/dL (12 Mar 2020 11:25)  POCT Blood Glucose.: 126 mg/dL (12 Mar 2020 06:39)  POCT Blood Glucose.: 80 mg/dL (12 Mar 2020 00:02)  POCT Blood Glucose.: 105 mg/dL (11 Mar 2020 23:06)  POCT Blood Glucose.: 107 mg/dL (11 Mar 2020 22:12)  POCT Blood Glucose.: 109 mg/dL (11 Mar 2020 21:13)  POCT Blood Glucose.: 146 mg/dL (11 Mar 2020 20:08)  POCT Blood Glucose.: 123 mg/dL (11 Mar 2020 19:20)  POCT Blood Glucose.: 108 mg/dL (11 Mar 2020 18:42)  POCT Blood Glucose.: 147 mg/dL (11 Mar 2020 17:38)  POCT Blood Glucose.: 118 mg/dL (11 Mar 2020 16:16)      RECENT CULTURES:      RADIOLOGY & ADDITIONAL TESTS:      PHYSICAL EXAM:    GENERAL: NAD, well-groomed, well-developed  HEAD:  Atraumatic, Normocephalic  EYES: EOMI, PERRLA, conjunctiva and sclera clear  ENMT: Moist mucous membranes  NECK: Supple, No JVD  NERVOUS SYSTEM:  Alert & Oriented X3, Motor Strength 5/5 B/L upper and lower extremities; DTRs 2+ intact and symmetric  CHEST/LUNG: Clear to auscultation bilaterally; No rales, rhonchi, wheezing, or rubs  HEART: Regular rate and rhythm; No murmurs, rubs, or gallops  ABDOMEN: Soft, Nontender, Nondistended; Bowel sounds present  EXTREMITIES:  2+ Peripheral Pulses, No clubbing, cyanosis, or edema

## 2020-03-12 NOTE — DISCHARGE NOTE PROVIDER - HOSPITAL COURSE
19yof w/ pmh dm1 on insulin presenting w/ dka. admitted to micu. downgraded to hospitalist floors 3/11/20.    #DM type 1 on insulin complicated by DKA, stable - hx of mult ep dka prompting hospitalization in past. DKA due to med non adherence - insurance barriers precluding lantus. a1c 13 in 12/2019, uncontrolled. sp insulin drip in MICU, downgraded 3/11/20. nutri eval pending. diabetic nurse educator eval pending. iss + fs achs. premeal insulin. lantus started by micu. Lantus 28 & lispro 3 for now per endocrine.  To f/u with endocrine as outpateint Patient was admitted to MICU with DKA secondary to non compliance. Started on Insulin Infusion and monitored closely. Once DKA resolved, she was downgraded to Medicine Service. Insulin doses were adjusted. She is feeling much better and is stable for discharge. She was seen and followed by Deborah during hospitalization. She is counselled extensively regarding medication complaince.        PHYSICAL EXAM:    Vital Signs     T(C): 36.9 (13 Mar 2020 07:30), Max: 36.9 (13 Mar 2020 07:30)    T(F): 98.4 (13 Mar 2020 07:30), Max: 98.4 (13 Mar 2020 07:30)    HR: 67 (13 Mar 2020 07:30) (67 - 101)    BP: 139/92 (13 Mar 2020 07:30) (101/64 - 139/92)    BP(mean): 78 (12 Mar 2020 16:30) (78 - 78)    RR: 16 (13 Mar 2020 07:30) (16 - 18)    SpO2: 100% (13 Mar 2020 07:30) (98% - 100%)    General: Well developed. Well nourished. No acute distress    HEENT: EOMI. Clear conjunctivae. Moist mucus membrane    Neck: Supple.     Chest: CTA bilaterally - no wheezing, rales or rhonchi.     Heart: Normal S1 & S2 with RRR.     Abdomen: Soft. Non-tender. Non-distended. + BS    Ext: No pedal edema. No calf tenderness     Neuro: AAO x 3. No focal deficit. No speech disorder.    Skin: Warm and Dry    Psychiatry: Normal mood and affect        Time spent: 40 minutes Patient was admitted to MICU with DKA secondary to non compliance. Started on Insulin Infusion and monitored closely. HbA1c 12. Once DKA resolved, she was downgraded to Medicine Service. Insulin doses were adjusted. She is feeling much better and is stable for discharge. She was seen and followed by Deborah during hospitalization. She is counselled extensively regarding medication complaince.        PHYSICAL EXAM:    Vital Signs     T(C): 36.9 (13 Mar 2020 07:30), Max: 36.9 (13 Mar 2020 07:30)    T(F): 98.4 (13 Mar 2020 07:30), Max: 98.4 (13 Mar 2020 07:30)    HR: 67 (13 Mar 2020 07:30) (67 - 101)    BP: 139/92 (13 Mar 2020 07:30) (101/64 - 139/92)    BP(mean): 78 (12 Mar 2020 16:30) (78 - 78)    RR: 16 (13 Mar 2020 07:30) (16 - 18)    SpO2: 100% (13 Mar 2020 07:30) (98% - 100%)    General: Well developed. Well nourished. No acute distress    HEENT: EOMI. Clear conjunctivae. Moist mucus membrane    Neck: Supple.     Chest: CTA bilaterally - no wheezing, rales or rhonchi.     Heart: Normal S1 & S2 with RRR.     Abdomen: Soft. Non-tender. Non-distended. + BS    Ext: No pedal edema. No calf tenderness     Neuro: AAO x 3. No focal deficit. No speech disorder.    Skin: Warm and Dry    Psychiatry: Normal mood and affect        Time spent: 40 minutes

## 2020-03-12 NOTE — DISCHARGE NOTE PROVIDER - PROVIDER TOKENS
PROVIDER:[TOKEN:[[id number]]] PROVIDER:[TOKEN:[[id number]]],PROVIDER:[TOKEN:[[id number]]]

## 2020-03-12 NOTE — CONSULT NOTE ADULT - SUBJECTIVE AND OBJECTIVE BOX
Patient is a 19y old  Female who presents with a chief complaint of DKA (11 Mar 2020 23:19)    HPI:  19F w/ T1DM with recurrent admissions for noncompliance and DKA here with few day history of not feeling well, found to be in DKA due to not taking Lantus. In the ER she was found to have a BG >700 with pH of 7.0 and an Anion Gap of >37.         PAST MEDICAL & SURGICAL HISTORY:  Acute renal failure  Asthma  Diabetes  Diabetes mellitus type 1  S/P   S/P dilatation and curettage: miscarriage x 2  S/P tonsillectomy and adenoidectomy  S/P tonsillectomy and adenoidectomy      SH: see above    FAMILY HISTORY:  No pertinent family history in first degree relatives        Allergies    apple (Unknown)  No Known Drug Allergies  Pears (Unknown)    Intolerances        REVIEW OF SYSTEMS:    CONSTITUTIONAL: No fever, weight loss, or fatigue  EYES: No eye pain, visual disturbances, or discharge  ENMT:  No difficulty hearing, tinnitus, vertigo; No sinus or throat pain  NECK: No pain or stiffness  RESPIRATORY: No cough, wheezing, chills or hemoptysis; No shortness of breath  CARDIOVASCULAR: No chest pain, palpitations, dizziness, or leg swelling  GASTROINTESTINAL: No abdominal or epigastric pain. No nausea, vomiting, or hematemesis; No diarrhea or constipation. No melena or hematochezia.  NEUROLOGICAL: No headaches, memory loss, loss of strength, numbness, or tremors  SKIN: No itching, burning, rashes, or lesions   MUSCULOSKELETAL: No joint pain or swelling; No muscle, back, or extremity pain  PSYCHIATRIC: No depression, anxiety, mood swings, or difficulty sleeping        MEDICATIONS  (STANDING):  dextrose 5%. 1000 milliLiter(s) (50 mL/Hr) IV Continuous <Continuous>  dextrose 50% Injectable 12.5 Gram(s) IV Push once  dextrose 50% Injectable 25 Gram(s) IV Push once  dextrose 50% Injectable 25 Gram(s) IV Push once  insulin glargine Injectable (LANTUS) 30 Unit(s) SubCutaneous at bedtime  insulin lispro (HumaLOG) corrective regimen sliding scale   SubCutaneous three times a day before meals  insulin lispro Injectable (HumaLOG) 3 Unit(s) SubCutaneous three times a day before meals  sodium chloride 0.9%. 1000 milliLiter(s) (100 mL/Hr) IV Continuous <Continuous>    MEDICATIONS  (PRN):  acetaminophen   Tablet .. 650 milliGRAM(s) Oral every 6 hours PRN Temp greater or equal to 38C (100.4F), Mild Pain (1 - 3)  benzocaine 15 mG/menthol 3.6 mG (Sugar-Free) Lozenge 1 Lozenge Oral every 4 hours PRN Sore Throat  dextrose 40% Gel 15 Gram(s) Oral once PRN Blood Glucose LESS THAN 70 milliGRAM(s)/deciLiter  glucagon  Injectable 1 milliGRAM(s) IntraMuscular once PRN Glucose <70 milliGRAM(s)/deciLiter  ondansetron Injectable 4 milliGRAM(s) IV Push every 6 hours PRN Nausea and/or Vomiting      Vital Signs Last 24 Hrs  T(C): 36.7 (12 Mar 2020 07:41), Max: 36.8 (11 Mar 2020 12:30)  T(F): 98.1 (12 Mar 2020 07:41), Max: 98.2 (11 Mar 2020 12:30)  HR: 75 (12 Mar 2020 08:00) (75 - 120)  BP: 110/79 (12 Mar 2020 08:00) (88/65 - 135/80)  BP(mean): 91 (12 Mar 2020 08:00) (70 - 101)  RR: 13 (12 Mar 2020 08:00) (13 - 29)  SpO2: 100% (12 Mar 2020 08:00) (100% - 100%)      PHYSICAL EXAM:    Constitutional: NAD, well-groomed, well-developed  HEENT: EOMI, no exophalmos  Neck: trachea midline, no thyroid enlargement  Respiratory: CTAB  Cardiovascular: S1 and S2, RRR, no M/G/R  Gastrointestinal: BS+, soft, ntnd  Extremities: No peripheral edema  Neurological: A/O x 3, no focal deficits  Psychiatric: Normal mood, normal affect  Skin: No rashes, no acanthosis        LABS      138  |  103  |  8.0  ----------------------------<  123<H>  4.0   |  16.0<L>  |  0.71    Ca    8.3<L>      12 Mar 2020 06:04  Phos  2.8     03-12  Mg     2.3     -12    TPro  6.7  /  Alb  3.8  /  TBili  0.2<L>  /  DBili  x   /  AST  50<H>  /  ALT  39<H>  /  AlkPhos  124<H>                            12.8   8.57  )-----------( 343      ( 12 Mar 2020 06:04 )             38.6           Hemoglobin A1C, Whole Blood: 12.0 % ( @ 06:04)    Ketone - Urine: Large ( @ 10:56)    Alkaline Phosphatase, Serum: 124 U/L (20 @ 16:39)  Aspartate Aminotransferase (AST/SGOT): 50 U/L (20 @ 16:39)  Alanine Aminotransferase (ALT/SGPT): 39 U/L (20 @ 16:39)  Albumin, Serum: 3.8 g/dL (20 @ 16:39)  Aspartate Aminotransferase (AST/SGOT): 66 U/L (20 @ 10:49)  Alkaline Phosphatase, Serum: 140 U/L (20 @ 10:49)  Alanine Aminotransferase (ALT/SGPT): 45 U/L (20 @ 10:49)  Albumin, Serum: 3.9 g/dL (20 @ 10:49)      CAPILLARY BLOOD GLUCOSE  126 (12 Mar 2020 06:40)  80 (12 Mar 2020 00:00)  105 (11 Mar 2020 23:00)  107 (11 Mar 2020 22:00)  109 (11 Mar 2020 21:00)  146 (11 Mar 2020 20:00)  123 (11 Mar 2020 19:30)      POCT Blood Glucose.: 67 mg/dL (12 Mar 2020 11:25)  POCT Blood Glucose.: 126 mg/dL (12 Mar 2020 06:39)  POCT Blood Glucose.: 80 mg/dL (12 Mar 2020 00:02)  POCT Blood Glucose.: 105 mg/dL (11 Mar 2020 23:06)  POCT Blood Glucose.: 107 mg/dL (11 Mar 2020 22:12)  POCT Blood Glucose.: 109 mg/dL (11 Mar 2020 21:13)  POCT Blood Glucose.: 146 mg/dL (11 Mar 2020 20:08)  POCT Blood Glucose.: 123 mg/dL (11 Mar 2020 19:20)  POCT Blood Glucose.: 108 mg/dL (11 Mar 2020 18:42)  POCT Blood Glucose.: 147 mg/dL (11 Mar 2020 17:38)  POCT Blood Glucose.: 118 mg/dL (11 Mar 2020 16:16)  POCT Blood Glucose.: 114 mg/dL (11 Mar 2020 15:04)  POCT Blood Glucose.: 170 mg/dL (11 Mar 2020 13:54)  POCT Blood Glucose.: 325 mg/dL (11 Mar 2020 13:09) Patient is a 19y old  Female who presents with a chief complaint of DKA (11 Mar 2020 23:19)    HPI:  19F w/ T1DM with recurrent admissions for noncompliance and DKA here with few day history of not feeling well, found to be in DKA due to not taking Lantus. In the ER she was found to have a BG >700 with pH of 7.0 and an Anion Gap of >37.   type 1 diabetetes since 2 years old  lives with mom. no etoh/smoking  home meds: lantus 28 units, humalog ICR 1:6 with ISF 1:30    PAST MEDICAL & SURGICAL HISTORY:  Acute renal failure  Asthma  Diabetes  Diabetes mellitus type 1  S/P   S/P dilatation and curettage: miscarriage x 2  S/P tonsillectomy and adenoidectomy  S/P tonsillectomy and adenoidectomy      SH: see above    FAMILY HISTORY:  No pertinent family history in first degree relatives        Allergies    apple (Unknown)  No Known Drug Allergies  Pears (Unknown)    Intolerances        REVIEW OF SYSTEMS:    CONSTITUTIONAL: No fever, weight loss, or fatigue  EYES: No eye pain, visual disturbances, or discharge  ENMT:  No difficulty hearing, tinnitus, vertigo; No sinus or throat pain  NECK: No pain or stiffness  RESPIRATORY: No cough, wheezing, chills or hemoptysis; No shortness of breath  CARDIOVASCULAR: No chest pain, palpitations, dizziness, or leg swelling  GASTROINTESTINAL: No abdominal or epigastric pain. No nausea, vomiting, or hematemesis; No diarrhea or constipation. No melena or hematochezia.  NEUROLOGICAL: No headaches, memory loss, loss of strength, numbness, or tremors  SKIN: No itching, burning, rashes, or lesions   MUSCULOSKELETAL: No joint pain or swelling; No muscle, back, or extremity pain  PSYCHIATRIC: No depression, anxiety, mood swings, or difficulty sleeping        MEDICATIONS  (STANDING):  dextrose 5%. 1000 milliLiter(s) (50 mL/Hr) IV Continuous <Continuous>  dextrose 50% Injectable 12.5 Gram(s) IV Push once  dextrose 50% Injectable 25 Gram(s) IV Push once  dextrose 50% Injectable 25 Gram(s) IV Push once  insulin glargine Injectable (LANTUS) 30 Unit(s) SubCutaneous at bedtime  insulin lispro (HumaLOG) corrective regimen sliding scale   SubCutaneous three times a day before meals  insulin lispro Injectable (HumaLOG) 3 Unit(s) SubCutaneous three times a day before meals  sodium chloride 0.9%. 1000 milliLiter(s) (100 mL/Hr) IV Continuous <Continuous>    MEDICATIONS  (PRN):  acetaminophen   Tablet .. 650 milliGRAM(s) Oral every 6 hours PRN Temp greater or equal to 38C (100.4F), Mild Pain (1 - 3)  benzocaine 15 mG/menthol 3.6 mG (Sugar-Free) Lozenge 1 Lozenge Oral every 4 hours PRN Sore Throat  dextrose 40% Gel 15 Gram(s) Oral once PRN Blood Glucose LESS THAN 70 milliGRAM(s)/deciLiter  glucagon  Injectable 1 milliGRAM(s) IntraMuscular once PRN Glucose <70 milliGRAM(s)/deciLiter  ondansetron Injectable 4 milliGRAM(s) IV Push every 6 hours PRN Nausea and/or Vomiting      Vital Signs Last 24 Hrs  T(C): 36.7 (12 Mar 2020 07:41), Max: 36.8 (11 Mar 2020 12:30)  T(F): 98.1 (12 Mar 2020 07:41), Max: 98.2 (11 Mar 2020 12:30)  HR: 75 (12 Mar 2020 08:00) (75 - 120)  BP: 110/79 (12 Mar 2020 08:00) (88/65 - 135/80)  BP(mean): 91 (12 Mar 2020 08:00) (70 - 101)  RR: 13 (12 Mar 2020 08:00) (13 - 29)  SpO2: 100% (12 Mar 2020 08:00) (100% - 100%)      PHYSICAL EXAM:    Constitutional: NAD, well-groomed, well-developed  HEENT: EOMI, no exophalmos  Neck: trachea midline, no thyroid enlargement  Respiratory: CTAB  Cardiovascular: S1 and S2, RRR, no M/G/R  Gastrointestinal: BS+, soft, ntnd  Extremities: No peripheral edema  Neurological: A/O x 3, no focal deficits  Psychiatric: Normal mood, normal affect  Skin: No rashes, no acanthosis        LABS      138  |  103  |  8.0  ----------------------------<  123<H>  4.0   |  16.0<L>  |  0.71    Ca    8.3<L>      12 Mar 2020 06:04  Phos  2.8       Mg     2.3         TPro  6.7  /  Alb  3.8  /  TBili  0.2<L>  /  DBili  x   /  AST  50<H>  /  ALT  39<H>  /  AlkPhos  124<H>                            12.8   8.57  )-----------( 343      ( 12 Mar 2020 06:04 )             38.6           Hemoglobin A1C, Whole Blood: 12.0 % ( @ 06:04)    Ketone - Urine: Large ( @ 10:56)    Alkaline Phosphatase, Serum: 124 U/L (20 @ 16:39)  Aspartate Aminotransferase (AST/SGOT): 50 U/L (20 @ 16:39)  Alanine Aminotransferase (ALT/SGPT): 39 U/L (20 @ 16:39)  Albumin, Serum: 3.8 g/dL (20 @ 16:39)  Aspartate Aminotransferase (AST/SGOT): 66 U/L (20 @ 10:49)  Alkaline Phosphatase, Serum: 140 U/L (20 @ 10:49)  Alanine Aminotransferase (ALT/SGPT): 45 U/L (20 @ 10:49)  Albumin, Serum: 3.9 g/dL (20 @ 10:49)      CAPILLARY BLOOD GLUCOSE  126 (12 Mar 2020 06:40)  80 (12 Mar 2020 00:00)  105 (11 Mar 2020 23:00)  107 (11 Mar 2020 22:00)  109 (11 Mar 2020 21:00)  146 (11 Mar 2020 20:00)  123 (11 Mar 2020 19:30)      POCT Blood Glucose.: 67 mg/dL (12 Mar 2020 11:25)  POCT Blood Glucose.: 126 mg/dL (12 Mar 2020 06:39)  POCT Blood Glucose.: 80 mg/dL (12 Mar 2020 00:02)  POCT Blood Glucose.: 105 mg/dL (11 Mar 2020 23:06)  POCT Blood Glucose.: 107 mg/dL (11 Mar 2020 22:12)  POCT Blood Glucose.: 109 mg/dL (11 Mar 2020 21:13)  POCT Blood Glucose.: 146 mg/dL (11 Mar 2020 20:08)  POCT Blood Glucose.: 123 mg/dL (11 Mar 2020 19:20)  POCT Blood Glucose.: 108 mg/dL (11 Mar 2020 18:42)  POCT Blood Glucose.: 147 mg/dL (11 Mar 2020 17:38)  POCT Blood Glucose.: 118 mg/dL (11 Mar 2020 16:16)  POCT Blood Glucose.: 114 mg/dL (11 Mar 2020 15:04)  POCT Blood Glucose.: 170 mg/dL (11 Mar 2020 13:54)  POCT Blood Glucose.: 325 mg/dL (11 Mar 2020 13:09) Patient is a 19y old  Female who presents with a chief complaint of DKA (11 Mar 2020 23:19)    HPI:  19F w/ T1DM with recurrent admissions for noncompliance and DKA here with few day history of not feeling well, found to be in DKA due to not taking Lantus due to not having insurance. In the ER she was found to have a BG >700 with pH of 7.0 and an Anion Gap of >37.   type 1 diabetes since 2 years old  lives with mom. no etoh/smoking  home meds: lantus 30 units, humalog ICR 1:6 with ISF 1:28  still has not seen endocrinology  reports that insurance had stopped and patient did not have any basal insulin though reported that novolog was covered  home meds: lantus 30, novolog ICR 1:6, ISF 1:28  reports trying to carb count  has a daughter, 11months at home    PAST MEDICAL & SURGICAL HISTORY:  Acute renal failure  Asthma  Diabetes  Diabetes mellitus type 1  S/P   S/P dilatation and curettage: miscarriage x 2  S/P tonsillectomy and adenoidectomy  S/P tonsillectomy and adenoidectomy      SH: see above    FAMILY HISTORY:  No pertinent family history in first degree relatives        Allergies    apple (Unknown)  No Known Drug Allergies  Pears (Unknown)    Intolerances        REVIEW OF SYSTEMS:    CONSTITUTIONAL: No fever, weight loss, or fatigue  EYES: No eye pain, visual disturbances, or discharge  ENMT:  No difficulty hearing, tinnitus, vertigo; No sinus or throat pain  NECK: No pain or stiffness  RESPIRATORY: No cough, wheezing, chills or hemoptysis; No shortness of breath  CARDIOVASCULAR: No chest pain, palpitations, dizziness, or leg swelling  GASTROINTESTINAL: No abdominal or epigastric pain. No nausea, vomiting, or hematemesis; No diarrhea or constipation. No melena or hematochezia.  NEUROLOGICAL: No headaches, memory loss, loss of strength, numbness, or tremors  SKIN: No itching, burning, rashes, or lesions   MUSCULOSKELETAL: No joint pain or swelling; No muscle, back, or extremity pain  PSYCHIATRIC: No depression, anxiety, mood swings, or difficulty sleeping        MEDICATIONS  (STANDING):  dextrose 5%. 1000 milliLiter(s) (50 mL/Hr) IV Continuous <Continuous>  dextrose 50% Injectable 12.5 Gram(s) IV Push once  dextrose 50% Injectable 25 Gram(s) IV Push once  dextrose 50% Injectable 25 Gram(s) IV Push once  insulin glargine Injectable (LANTUS) 30 Unit(s) SubCutaneous at bedtime  insulin lispro (HumaLOG) corrective regimen sliding scale   SubCutaneous three times a day before meals  insulin lispro Injectable (HumaLOG) 3 Unit(s) SubCutaneous three times a day before meals  sodium chloride 0.9%. 1000 milliLiter(s) (100 mL/Hr) IV Continuous <Continuous>    MEDICATIONS  (PRN):  acetaminophen   Tablet .. 650 milliGRAM(s) Oral every 6 hours PRN Temp greater or equal to 38C (100.4F), Mild Pain (1 - 3)  benzocaine 15 mG/menthol 3.6 mG (Sugar-Free) Lozenge 1 Lozenge Oral every 4 hours PRN Sore Throat  dextrose 40% Gel 15 Gram(s) Oral once PRN Blood Glucose LESS THAN 70 milliGRAM(s)/deciLiter  glucagon  Injectable 1 milliGRAM(s) IntraMuscular once PRN Glucose <70 milliGRAM(s)/deciLiter  ondansetron Injectable 4 milliGRAM(s) IV Push every 6 hours PRN Nausea and/or Vomiting      Vital Signs Last 24 Hrs  T(C): 36.7 (12 Mar 2020 07:41), Max: 36.8 (11 Mar 2020 12:30)  T(F): 98.1 (12 Mar 2020 07:41), Max: 98.2 (11 Mar 2020 12:30)  HR: 75 (12 Mar 2020 08:00) (75 - 120)  BP: 110/79 (12 Mar 2020 08:00) (88/65 - 135/80)  BP(mean): 91 (12 Mar 2020 08:00) (70 - 101)  RR: 13 (12 Mar 2020 08:00) (13 - 29)  SpO2: 100% (12 Mar 2020 08:00) (100% - 100%)      PHYSICAL EXAM:    Constitutional: NAD, well-groomed, well-developed  HEENT: EOMI, no exophalmos  Neck: trachea midline, no thyroid enlargement  Respiratory: CTAB  Cardiovascular: S1 and S2, RRR, no M/G/R  Gastrointestinal: BS+, soft, ntnd  Extremities: No peripheral edema  Neurological: A/O x 3, no focal deficits  Psychiatric: Normal mood, normal affect  Skin: No rashes, no acanthosis        LABS      138  |  103  |  8.0  ----------------------------<  123<H>  4.0   |  16.0<L>  |  0.71    Ca    8.3<L>      12 Mar 2020 06:04  Phos  2.8       Mg     2.3         TPro  6.7  /  Alb  3.8  /  TBili  0.2<L>  /  DBili  x   /  AST  50<H>  /  ALT  39<H>  /  AlkPhos  124<H>                            12.8   8.57  )-----------( 343      ( 12 Mar 2020 06:04 )             38.6           Hemoglobin A1C, Whole Blood: 12.0 % ( @ 06:04)    Ketone - Urine: Large ( @ 10:56)    Alkaline Phosphatase, Serum: 124 U/L (20 @ 16:39)  Aspartate Aminotransferase (AST/SGOT): 50 U/L (20 @ 16:39)  Alanine Aminotransferase (ALT/SGPT): 39 U/L (20 @ 16:39)  Albumin, Serum: 3.8 g/dL (20 @ 16:39)  Aspartate Aminotransferase (AST/SGOT): 66 U/L (20 @ 10:49)  Alkaline Phosphatase, Serum: 140 U/L (20 @ 10:49)  Alanine Aminotransferase (ALT/SGPT): 45 U/L (20 @ 10:49)  Albumin, Serum: 3.9 g/dL (20 @ 10:49)      CAPILLARY BLOOD GLUCOSE  126 (12 Mar 2020 06:40)  80 (12 Mar 2020 00:00)  105 (11 Mar 2020 23:00)  107 (11 Mar 2020 22:00)  109 (11 Mar 2020 21:00)  146 (11 Mar 2020 20:00)  123 (11 Mar 2020 19:30)      POCT Blood Glucose.: 67 mg/dL (12 Mar 2020 11:25)  POCT Blood Glucose.: 126 mg/dL (12 Mar 2020 06:39)  POCT Blood Glucose.: 80 mg/dL (12 Mar 2020 00:02)  POCT Blood Glucose.: 105 mg/dL (11 Mar 2020 23:06)  POCT Blood Glucose.: 107 mg/dL (11 Mar 2020 22:12)  POCT Blood Glucose.: 109 mg/dL (11 Mar 2020 21:13)  POCT Blood Glucose.: 146 mg/dL (11 Mar 2020 20:08)  POCT Blood Glucose.: 123 mg/dL (11 Mar 2020 19:20)  POCT Blood Glucose.: 108 mg/dL (11 Mar 2020 18:42)  POCT Blood Glucose.: 147 mg/dL (11 Mar 2020 17:38)  POCT Blood Glucose.: 118 mg/dL (11 Mar 2020 16:16)  POCT Blood Glucose.: 114 mg/dL (11 Mar 2020 15:04)  POCT Blood Glucose.: 170 mg/dL (11 Mar 2020 13:54)  POCT Blood Glucose.: 325 mg/dL (11 Mar 2020 13:09)

## 2020-03-12 NOTE — DISCHARGE NOTE PROVIDER - CARE PROVIDERS DIRECT ADDRESSES
[email or web address]

## 2020-03-12 NOTE — PROGRESS NOTE ADULT - NSHPATTENDINGPLANDISCUSS_GEN_ALL_CORE
pt, rn
the patient.  All imaging and results of lab/other studies reviewed by me. All questions answered to their satisfaction. At this time they agree with the current plan of therapy.

## 2020-03-13 ENCOUNTER — TRANSCRIPTION ENCOUNTER (OUTPATIENT)
Age: 20
End: 2020-03-13

## 2020-03-13 VITALS
HEART RATE: 88 BPM | SYSTOLIC BLOOD PRESSURE: 110 MMHG | OXYGEN SATURATION: 100 % | RESPIRATION RATE: 18 BRPM | DIASTOLIC BLOOD PRESSURE: 78 MMHG | TEMPERATURE: 98 F

## 2020-03-13 LAB
24R-OH-CALCIDIOL SERPL-MCNC: 9.3 NG/ML — LOW (ref 30–80)
ALBUMIN SERPL ELPH-MCNC: 3 G/DL — LOW (ref 3.3–5.2)
ALP SERPL-CCNC: 113 U/L — SIGNIFICANT CHANGE UP (ref 40–120)
ALT FLD-CCNC: 38 U/L — HIGH
ANION GAP SERPL CALC-SCNC: 15 MMOL/L — SIGNIFICANT CHANGE UP (ref 5–17)
AST SERPL-CCNC: 96 U/L — HIGH
BASOPHILS # BLD AUTO: 0.02 K/UL — SIGNIFICANT CHANGE UP (ref 0–0.2)
BASOPHILS NFR BLD AUTO: 0.5 % — SIGNIFICANT CHANGE UP (ref 0–2)
BILIRUB DIRECT SERPL-MCNC: 0.1 MG/DL — SIGNIFICANT CHANGE UP (ref 0–0.3)
BILIRUB INDIRECT FLD-MCNC: 0.2 MG/DL — SIGNIFICANT CHANGE UP (ref 0.2–1)
BILIRUB SERPL-MCNC: 0.3 MG/DL — LOW (ref 0.4–2)
BUN SERPL-MCNC: 7 MG/DL — LOW (ref 8–20)
CALCIUM SERPL-MCNC: 8.2 MG/DL — LOW (ref 8.6–10.2)
CALCIUM SERPL-MCNC: 8.3 MG/DL — LOW (ref 8.4–10.5)
CHLORIDE SERPL-SCNC: 108 MMOL/L — HIGH (ref 98–107)
CO2 SERPL-SCNC: 21 MMOL/L — LOW (ref 22–29)
CREAT SERPL-MCNC: 0.51 MG/DL — SIGNIFICANT CHANGE UP (ref 0.5–1.3)
EOSINOPHIL # BLD AUTO: 0.04 K/UL — SIGNIFICANT CHANGE UP (ref 0–0.5)
EOSINOPHIL NFR BLD AUTO: 0.9 % — SIGNIFICANT CHANGE UP (ref 0–6)
GLUCOSE BLDC GLUCOMTR-MCNC: 144 MG/DL — HIGH (ref 70–99)
GLUCOSE BLDC GLUCOMTR-MCNC: 64 MG/DL — LOW (ref 70–99)
GLUCOSE BLDC GLUCOMTR-MCNC: 93 MG/DL — SIGNIFICANT CHANGE UP (ref 70–99)
GLUCOSE SERPL-MCNC: 94 MG/DL — SIGNIFICANT CHANGE UP (ref 70–99)
HCT VFR BLD CALC: 36.7 % — SIGNIFICANT CHANGE UP (ref 34.5–45)
HGB BLD-MCNC: 11.9 G/DL — SIGNIFICANT CHANGE UP (ref 11.5–15.5)
IMM GRANULOCYTES NFR BLD AUTO: 0.2 % — SIGNIFICANT CHANGE UP (ref 0–1.5)
LYMPHOCYTES # BLD AUTO: 2.06 K/UL — SIGNIFICANT CHANGE UP (ref 1–3.3)
LYMPHOCYTES # BLD AUTO: 47.5 % — HIGH (ref 13–44)
MAGNESIUM SERPL-MCNC: 1.9 MG/DL — SIGNIFICANT CHANGE UP (ref 1.6–2.6)
MCHC RBC-ENTMCNC: 30.1 PG — SIGNIFICANT CHANGE UP (ref 27–34)
MCHC RBC-ENTMCNC: 32.4 GM/DL — SIGNIFICANT CHANGE UP (ref 32–36)
MCV RBC AUTO: 92.9 FL — SIGNIFICANT CHANGE UP (ref 80–100)
MONOCYTES # BLD AUTO: 0.44 K/UL — SIGNIFICANT CHANGE UP (ref 0–0.9)
MONOCYTES NFR BLD AUTO: 10.1 % — SIGNIFICANT CHANGE UP (ref 2–14)
NEUTROPHILS # BLD AUTO: 1.77 K/UL — LOW (ref 1.8–7.4)
NEUTROPHILS NFR BLD AUTO: 40.8 % — LOW (ref 43–77)
PHOSPHATE SERPL-MCNC: 3.6 MG/DL — SIGNIFICANT CHANGE UP (ref 2.4–4.7)
PLATELET # BLD AUTO: 251 K/UL — SIGNIFICANT CHANGE UP (ref 150–400)
POTASSIUM SERPL-MCNC: 4 MMOL/L — SIGNIFICANT CHANGE UP (ref 3.5–5.3)
POTASSIUM SERPL-SCNC: 4 MMOL/L — SIGNIFICANT CHANGE UP (ref 3.5–5.3)
PROT SERPL-MCNC: 5.5 G/DL — LOW (ref 6.6–8.7)
PTH-INTACT FLD-MCNC: 46 PG/ML — SIGNIFICANT CHANGE UP (ref 15–65)
RBC # BLD: 3.95 M/UL — SIGNIFICANT CHANGE UP (ref 3.8–5.2)
RBC # FLD: 12.9 % — SIGNIFICANT CHANGE UP (ref 10.3–14.5)
SODIUM SERPL-SCNC: 144 MMOL/L — SIGNIFICANT CHANGE UP (ref 135–145)
WBC # BLD: 4.34 K/UL — SIGNIFICANT CHANGE UP (ref 3.8–10.5)
WBC # FLD AUTO: 4.34 K/UL — SIGNIFICANT CHANGE UP (ref 3.8–10.5)

## 2020-03-13 PROCEDURE — 85027 COMPLETE CBC AUTOMATED: CPT

## 2020-03-13 PROCEDURE — 84132 ASSAY OF SERUM POTASSIUM: CPT

## 2020-03-13 PROCEDURE — 96374 THER/PROPH/DIAG INJ IV PUSH: CPT

## 2020-03-13 PROCEDURE — 82310 ASSAY OF CALCIUM: CPT

## 2020-03-13 PROCEDURE — 80053 COMPREHEN METABOLIC PANEL: CPT

## 2020-03-13 PROCEDURE — 82435 ASSAY OF BLOOD CHLORIDE: CPT

## 2020-03-13 PROCEDURE — 82947 ASSAY GLUCOSE BLOOD QUANT: CPT

## 2020-03-13 PROCEDURE — 82803 BLOOD GASES ANY COMBINATION: CPT

## 2020-03-13 PROCEDURE — 82330 ASSAY OF CALCIUM: CPT

## 2020-03-13 PROCEDURE — 84295 ASSAY OF SERUM SODIUM: CPT

## 2020-03-13 PROCEDURE — 36415 COLL VENOUS BLD VENIPUNCTURE: CPT

## 2020-03-13 PROCEDURE — 80048 BASIC METABOLIC PNL TOTAL CA: CPT

## 2020-03-13 PROCEDURE — 82962 GLUCOSE BLOOD TEST: CPT

## 2020-03-13 PROCEDURE — 80076 HEPATIC FUNCTION PANEL: CPT

## 2020-03-13 PROCEDURE — 80307 DRUG TEST PRSMV CHEM ANLYZR: CPT

## 2020-03-13 PROCEDURE — 96375 TX/PRO/DX INJ NEW DRUG ADDON: CPT

## 2020-03-13 PROCEDURE — 81001 URINALYSIS AUTO W/SCOPE: CPT

## 2020-03-13 PROCEDURE — 83735 ASSAY OF MAGNESIUM: CPT

## 2020-03-13 PROCEDURE — 99239 HOSP IP/OBS DSCHRG MGMT >30: CPT

## 2020-03-13 PROCEDURE — 93005 ELECTROCARDIOGRAM TRACING: CPT

## 2020-03-13 PROCEDURE — 84702 CHORIONIC GONADOTROPIN TEST: CPT

## 2020-03-13 PROCEDURE — 83605 ASSAY OF LACTIC ACID: CPT

## 2020-03-13 PROCEDURE — 99232 SBSQ HOSP IP/OBS MODERATE 35: CPT

## 2020-03-13 PROCEDURE — 83036 HEMOGLOBIN GLYCOSYLATED A1C: CPT

## 2020-03-13 PROCEDURE — 85014 HEMATOCRIT: CPT

## 2020-03-13 PROCEDURE — 83970 ASSAY OF PARATHORMONE: CPT

## 2020-03-13 PROCEDURE — 84100 ASSAY OF PHOSPHORUS: CPT

## 2020-03-13 PROCEDURE — 82009 KETONE BODYS QUAL: CPT

## 2020-03-13 PROCEDURE — 82306 VITAMIN D 25 HYDROXY: CPT

## 2020-03-13 PROCEDURE — 99285 EMERGENCY DEPT VISIT HI MDM: CPT | Mod: 25

## 2020-03-13 RX ORDER — INSULIN ASPART 100 [IU]/ML
3 INJECTION, SOLUTION SUBCUTANEOUS
Qty: 1 | Refills: 0
Start: 2020-03-13 | End: 2020-04-11

## 2020-03-13 RX ORDER — INSULIN LISPRO 100/ML
3 VIAL (ML) SUBCUTANEOUS
Qty: 1 | Refills: 0
Start: 2020-03-13

## 2020-03-13 RX ORDER — INSULIN GLARGINE 100 [IU]/ML
24 INJECTION, SOLUTION SUBCUTANEOUS
Qty: 1 | Refills: 0
Start: 2020-03-13 | End: 2020-04-11

## 2020-03-13 RX ORDER — INSULIN GLARGINE 100 [IU]/ML
24 INJECTION, SOLUTION SUBCUTANEOUS AT BEDTIME
Refills: 0 | Status: DISCONTINUED | OUTPATIENT
Start: 2020-03-13 | End: 2020-03-13

## 2020-03-13 RX ORDER — ENOXAPARIN SODIUM 100 MG/ML
24 INJECTION SUBCUTANEOUS
Qty: 1 | Refills: 0
Start: 2020-03-13

## 2020-03-13 RX ORDER — INSULIN ASPART 100 [IU]/ML
0 INJECTION, SOLUTION SUBCUTANEOUS
Qty: 0 | Refills: 0 | DISCHARGE

## 2020-03-13 RX ADMIN — Medication 3 UNIT(S): at 12:10

## 2020-03-13 NOTE — DISCHARGE NOTE NURSING/CASE MANAGEMENT/SOCIAL WORK - PATIENT PORTAL LINK FT
You can access the FollowMyHealth Patient Portal offered by [facility] by registering at the following website: [email or web address]. By joining [employer]’s FollowMyHealth portal, you will also be able to view your health information using other applications (apps) compatible with our system.

## 2020-03-13 NOTE — DIETITIAN INITIAL EVALUATION ADULT. - OTHER INFO
19F with known history of IDDM, who is non-compliant with her care and previous admissions for DKA.. Now presents with a few days of not feeling well. She states she hasn't had the Lantus in a while. In the ER she was found to have a BG >700 with pH of 7.0 and an Anion Gap of >37. She was started bolused with IV fluids, Given 2 amps of Bicarb IVP and was started on an insulin infusion.  Pt reports having poor eating habits secondary to her job. She reports only getting break per day when she is able to eat, at times only eats that one time per day. Pt reports unintentional wt loss of 16# over past 2 months. Pt reports taking insulin throughout day, however is unable to get lantus due to insurance issues. Pt also with poor PO intake since admission secondary to dislike of food, pt agreeable to trying Glucerna shakes. Recommended she keep with her at work when unable to get full meal in. Verbally reviewed therapeutic diet guidelines, pt reports being educated many times in past, but knows she needs to be more consistent with eating. Declined further education at this time. 19F with known history of IDDM, who is non-compliant with her care and previous admissions for DKA.. Now presents with a few days of not feeling well. She states she hasn't had the Lantus in a while. In the ER she was found to have a BG >700 with pH of 7.0 and an Anion Gap of >37. She was started bolused with IV fluids, Given 2 amps of Bicarb IVP and was started on an insulin infusion.  Pt reports having poor eating habits secondary to her job. She reports only getting one break per day; when she is able to eat. At times only eats that one time per day. Pt reports unintentional wt loss of 16# over past 2 months. Pt reports taking insulin throughout day, however is unable to get lantus due to insurance issues. Pt also with poor PO intake since admission secondary to dislike of food, pt agreeable to trying Glucerna shakes. Recommended she keep with her at work when unable to get full meal in. Verbally reviewed therapeutic diet guidelines, pt reports being educated many times in past, but knows she needs to be more consistent with eating. Declined further education at this time.

## 2020-03-13 NOTE — DIETITIAN INITIAL EVALUATION ADULT. - DIET TYPE
Detail Level: Detailed Quality 130: Documentation Of Current Medications In The Medical Record: Current Medications Documented Quality 226: Preventive Care And Screening: Tobacco Use: Screening And Cessation Intervention: Patient screened for tobacco use and is an ex/non-smoker supplement (specify)/Glucerna shake TID

## 2020-03-13 NOTE — PROGRESS NOTE ADULT - SUBJECTIVE AND OBJECTIVE BOX
Interval Events:  no overnight events  follow up on type 1 diabetes    patient seen and examined at bedside.  mild hypoglycemia  wants to go hoome  no complaints    REVIEW OF SYSTEMS:    CONSTITUTIONAL: No fever, weight loss, or fatigue  EYES: No eye pain, visual disturbances, or discharge  ENMT:  No difficulty hearing, tinnitus, vertigo; No sinus or throat pain  NECK: No pain or stiffness  RESPIRATORY: No cough, wheezing, chills or hemoptysis; No shortness of breath  CARDIOVASCULAR: No chest pain, palpitations, dizziness, or leg swelling  GASTROINTESTINAL: No abdominal or epigastric pain. No nausea, vomiting, or hematemesis; No diarrhea or constipation. No melena or hematochezia.  NEUROLOGICAL: No headaches, memory loss, loss of strength, numbness, or tremors  SKIN: No itching, burning, rashes, or lesions   MUSCULOSKELETAL: No joint pain or swelling; No muscle, back, or extremity pain  PSYCHIATRIC: No depression, anxiety, mood swings, or difficulty sleeping        apple (Unknown)  No Known Drug Allergies  Pears (Unknown)      MEDICATIONS  (STANDING):  dextrose 5%. 1000 milliLiter(s) (50 mL/Hr) IV Continuous <Continuous>  dextrose 50% Injectable 12.5 Gram(s) IV Push once  dextrose 50% Injectable 25 Gram(s) IV Push once  dextrose 50% Injectable 25 Gram(s) IV Push once  insulin glargine Injectable (LANTUS) 28 Unit(s) SubCutaneous at bedtime  insulin lispro (HumaLOG) corrective regimen sliding scale   SubCutaneous three times a day before meals  insulin lispro Injectable (HumaLOG) 3 Unit(s) SubCutaneous three times a day before meals  sodium chloride 0.9%. 1000 milliLiter(s) (100 mL/Hr) IV Continuous <Continuous>    MEDICATIONS  (PRN):  acetaminophen   Tablet .. 650 milliGRAM(s) Oral every 6 hours PRN Temp greater or equal to 38C (100.4F), Mild Pain (1 - 3)  benzocaine 15 mG/menthol 3.6 mG (Sugar-Free) Lozenge 1 Lozenge Oral every 4 hours PRN Sore Throat  dextrose 40% Gel 15 Gram(s) Oral once PRN Blood Glucose LESS THAN 70 milliGRAM(s)/deciLiter  glucagon  Injectable 1 milliGRAM(s) IntraMuscular once PRN Glucose <70 milliGRAM(s)/deciLiter  ondansetron Injectable 4 milliGRAM(s) IV Push every 6 hours PRN Nausea and/or Vomiting      Vital Signs Last 24 Hrs  T(C): 36.9 (13 Mar 2020 07:30), Max: 36.9 (13 Mar 2020 07:30)  T(F): 98.4 (13 Mar 2020 07:30), Max: 98.4 (13 Mar 2020 07:30)  HR: 67 (13 Mar 2020 07:30) (67 - 101)  BP: 139/92 (13 Mar 2020 07:30) (101/64 - 139/92)  BP(mean): 78 (12 Mar 2020 16:30) (78 - 79)  RR: 16 (13 Mar 2020 07:30) (16 - 19)  SpO2: 100% (13 Mar 2020 07:30) (98% - 100%)    PHYSICAL EXAM:    Constitutional: NAD, well-groomed, well-developed, thin  HEENT: EOMI, no exophalmos  Neck: trachea midline, no thyroid enlargement  Respiratory: CTAB  Cardiovascular: S1 and S2, RRR, no M/G/R  Gastrointestinal: BS+, soft, ntnd  Extremities: No peripheral edema  Neurological: A/O x 3, no focal deficits  Psychiatric: Normal mood, normal affect  Skin: No rashes, no acanthosis          LABS  03-13    144  |  108<H>  |  7.0<L>  ----------------------------<  94  4.0   |  21.0<L>  |  0.51    Ca    8.2<L>      13 Mar 2020 07:01  Phos  3.6     03-13  Mg     1.9     03-13    TPro  5.5<L>  /  Alb  3.0<L>  /  TBili  0.3<L>  /  DBili  0.1  /  AST  96<H>  /  ALT  38<H>  /  AlkPhos  113  03-13                          11.9   4.34  )-----------( 251      ( 13 Mar 2020 07:01 )             36.7       Hemoglobin A1C, Whole Blood: 12.0 % (03-12-20 @ 06:04)    Alkaline Phosphatase, Serum: 113 U/L (03-13-20 @ 07:01)  Alanine Aminotransferase (ALT/SGPT): 38 U/L (03-13-20 @ 07:01)  Albumin, Serum: 3.0 g/dL (03-13-20 @ 07:01)  Aspartate Aminotransferase (AST/SGOT): 96 U/L (03-13-20 @ 07:01)  Alkaline Phosphatase, Serum: 124 U/L (03-11-20 @ 16:39)  Aspartate Aminotransferase (AST/SGOT): 50 U/L (03-11-20 @ 16:39)  Alanine Aminotransferase (ALT/SGPT): 39 U/L (03-11-20 @ 16:39)  Albumin, Serum: 3.8 g/dL (03-11-20 @ 16:39)    CAPILLARY BLOOD GLUCOSE      POCT Blood Glucose.: 93 mg/dL (13 Mar 2020 08:40)  POCT Blood Glucose.: 64 mg/dL (13 Mar 2020 08:24)  POCT Blood Glucose.: 76 mg/dL (12 Mar 2020 22:55)  POCT Blood Glucose.: 209 mg/dL (12 Mar 2020 16:19)  POCT Blood Glucose.: 103 mg/dL (12 Mar 2020 12:03) Interval Events:  no overnight events  follow up on type 1 diabetes    patient seen and examined at bedside.  mild hypoglycemia due to not having any dinner last night  wants to go home  no complaints    REVIEW OF SYSTEMS:    CONSTITUTIONAL: No fever, weight loss, or fatigue  EYES: No eye pain, visual disturbances, or discharge  ENMT:  No difficulty hearing, tinnitus, vertigo; No sinus or throat pain  NECK: No pain or stiffness  RESPIRATORY: No cough, wheezing, chills or hemoptysis; No shortness of breath  CARDIOVASCULAR: No chest pain, palpitations, dizziness, or leg swelling  GASTROINTESTINAL: No abdominal or epigastric pain. No nausea, vomiting, or hematemesis; No diarrhea or constipation. No melena or hematochezia.  NEUROLOGICAL: No headaches, memory loss, loss of strength, numbness, or tremors  SKIN: No itching, burning, rashes, or lesions   MUSCULOSKELETAL: No joint pain or swelling; No muscle, back, or extremity pain  PSYCHIATRIC: No depression, anxiety, mood swings, or difficulty sleeping        apple (Unknown)  No Known Drug Allergies  Pears (Unknown)      MEDICATIONS  (STANDING):  dextrose 5%. 1000 milliLiter(s) (50 mL/Hr) IV Continuous <Continuous>  dextrose 50% Injectable 12.5 Gram(s) IV Push once  dextrose 50% Injectable 25 Gram(s) IV Push once  dextrose 50% Injectable 25 Gram(s) IV Push once  insulin glargine Injectable (LANTUS) 28 Unit(s) SubCutaneous at bedtime  insulin lispro (HumaLOG) corrective regimen sliding scale   SubCutaneous three times a day before meals  insulin lispro Injectable (HumaLOG) 3 Unit(s) SubCutaneous three times a day before meals  sodium chloride 0.9%. 1000 milliLiter(s) (100 mL/Hr) IV Continuous <Continuous>    MEDICATIONS  (PRN):  acetaminophen   Tablet .. 650 milliGRAM(s) Oral every 6 hours PRN Temp greater or equal to 38C (100.4F), Mild Pain (1 - 3)  benzocaine 15 mG/menthol 3.6 mG (Sugar-Free) Lozenge 1 Lozenge Oral every 4 hours PRN Sore Throat  dextrose 40% Gel 15 Gram(s) Oral once PRN Blood Glucose LESS THAN 70 milliGRAM(s)/deciLiter  glucagon  Injectable 1 milliGRAM(s) IntraMuscular once PRN Glucose <70 milliGRAM(s)/deciLiter  ondansetron Injectable 4 milliGRAM(s) IV Push every 6 hours PRN Nausea and/or Vomiting      Vital Signs Last 24 Hrs  T(C): 36.9 (13 Mar 2020 07:30), Max: 36.9 (13 Mar 2020 07:30)  T(F): 98.4 (13 Mar 2020 07:30), Max: 98.4 (13 Mar 2020 07:30)  HR: 67 (13 Mar 2020 07:30) (67 - 101)  BP: 139/92 (13 Mar 2020 07:30) (101/64 - 139/92)  BP(mean): 78 (12 Mar 2020 16:30) (78 - 79)  RR: 16 (13 Mar 2020 07:30) (16 - 19)  SpO2: 100% (13 Mar 2020 07:30) (98% - 100%)    PHYSICAL EXAM:    Constitutional: NAD, well-groomed, well-developed, thin  HEENT: EOMI, no exophalmos  Neck: trachea midline, no thyroid enlargement  Respiratory: CTAB  Cardiovascular: S1 and S2, RRR, no M/G/R  Gastrointestinal: BS+, soft, ntnd  Extremities: No peripheral edema  Neurological: A/O x 3, no focal deficits  Psychiatric: Normal mood, normal affect  Skin: No rashes, no acanthosis          LABS  03-13    144  |  108<H>  |  7.0<L>  ----------------------------<  94  4.0   |  21.0<L>  |  0.51    Ca    8.2<L>      13 Mar 2020 07:01  Phos  3.6     03-13  Mg     1.9     03-13    TPro  5.5<L>  /  Alb  3.0<L>  /  TBili  0.3<L>  /  DBili  0.1  /  AST  96<H>  /  ALT  38<H>  /  AlkPhos  113  03-13                          11.9   4.34  )-----------( 251      ( 13 Mar 2020 07:01 )             36.7       Hemoglobin A1C, Whole Blood: 12.0 % (03-12-20 @ 06:04)    Alkaline Phosphatase, Serum: 113 U/L (03-13-20 @ 07:01)  Alanine Aminotransferase (ALT/SGPT): 38 U/L (03-13-20 @ 07:01)  Albumin, Serum: 3.0 g/dL (03-13-20 @ 07:01)  Aspartate Aminotransferase (AST/SGOT): 96 U/L (03-13-20 @ 07:01)  Alkaline Phosphatase, Serum: 124 U/L (03-11-20 @ 16:39)  Aspartate Aminotransferase (AST/SGOT): 50 U/L (03-11-20 @ 16:39)  Alanine Aminotransferase (ALT/SGPT): 39 U/L (03-11-20 @ 16:39)  Albumin, Serum: 3.8 g/dL (03-11-20 @ 16:39)    CAPILLARY BLOOD GLUCOSE      POCT Blood Glucose.: 93 mg/dL (13 Mar 2020 08:40)  POCT Blood Glucose.: 64 mg/dL (13 Mar 2020 08:24)  POCT Blood Glucose.: 76 mg/dL (12 Mar 2020 22:55)  POCT Blood Glucose.: 209 mg/dL (12 Mar 2020 16:19)  POCT Blood Glucose.: 103 mg/dL (12 Mar 2020 12:03)

## 2020-03-13 NOTE — DIETITIAN INITIAL EVALUATION ADULT. - PERTINENT LABORATORY DATA
03-13 Na144 mmol/L Glu 94 mg/dL K+ 4.0 mmol/L Cr  0.51 mg/dL BUN 7.0 mg/dL<L> Phos 3.6 mg/dL Alb 3.0 g/dL<L> PAB n/a

## 2020-03-13 NOTE — CHART NOTE - NSCHARTNOTEFT_GEN_A_CORE
Upon Nutritional Assessment by the Registered Dietitian your patient was determined to meet criteria / has evidence of the following diagnosis/diagnoses:          [ ]  Mild Protein Calorie Malnutrition        [ ]  Moderate Protein Calorie Malnutrition        [x ] Severe Protein Calorie Malnutrition        [ ] Unspecified Protein Calorie Malnutrition        [ ] Underweight / BMI <19        [ ] Morbid Obesity / BMI > 40      Findings as based on:  •  Comprehensive nutrition assessment and consultation  •  Calorie counts (nutrient intake analysis)  •  Food acceptance and intake status from observations by staff  •  Follow up  •  Patient education  •  Intervention secondary to interdisciplinary rounds  •   concerns      Treatment:    The following diet has been recommended:    GLucerna shake TID     PROVIDER Section:     By signing this assessment you are acknowledging and agree with the diagnosis/diagnoses assigned by the Registered Dietitian    Comments:

## 2020-03-13 NOTE — PROGRESS NOTE ADULT - ASSESSMENT
19F w/ T1DM with recurrent admissions for noncompliance and DKA here with few day history of not feeling well, found to be in DKA due to not taking Lantus. In the ER she was found to have a BG >700 with pH of 7.0 and an Anion Gap of >37.     DKA- resolved. due to noncompliance, s/p insulin gtt    Uncontrolled T1DM- hyperglycemic  -A1c 12  -check sugars AC and bedtime  -ensure diabetic diet  -never hold basal insulin/lantus, otherwise DKA can result  -recommend the following     decrease lantus to 24 units     keep lispro 3 units TID     continue insulin sliding scale    Hypoglycemia- probably due to not eating much. mild. monitor for now. keep insulin doses the same    Hypocalcemia- corrected normal. pending PTH and vitamin D. monitor for now 19F w/ T1DM with recurrent admissions for noncompliance and DKA here with few day history of not feeling well, found to be in DKA due to not taking Lantus. In the ER she was found to have a BG >700 with pH of 7.0 and an Anion Gap of >37.     DKA- resolved. due to noncompliance, s/p insulin gtt    Uncontrolled T1DM- hyperglycemic  -A1c 12  -check sugars AC and bedtime  -ensure diabetic diet  -never hold basal insulin/lantus, otherwise DKA can result  -recommend the following     decrease lantus to 24 units     keep lispro 3 units TID     continue insulin sliding scale  -educated on needing to see endo. patient will make appointment to see Dr. Freeman in the office    Hypoglycemia- due to not eating much. mild. monitor for now. changes to insulin as above    Hypocalcemia- corrected normal. pending PTH and vitamin D. monitor for now

## 2020-03-24 DIAGNOSIS — Z71.89 OTHER SPECIFIED COUNSELING: ICD-10-CM

## 2020-05-28 NOTE — ED ADULT NURSE NOTE - DISCHARGE DATE/TIME
5/28/2020          To Whom It May Concern:    Isa Mendoza had a PPD (tuberculin skin test) placed on the right forearm on 5/26/20. The results were read on 5/28/20 by Alyx Aranda LPN    RESULTS:  0 mm induration noted    Sincerely,        Alyx Aranda LPN  [facility]  [address] [place] [address]  Dept Phone: [phone]             24-Nov-2017 14:00

## 2020-08-14 NOTE — ED ADULT NURSE NOTE - OBJECTIVE STATEMENT
assumed care of patient, pt alert and oriented x4 came in c/o right flank pain sudden onset. VSS afebrile. pt states she is 19 weeks pregnant. respirations even unlabored. pt states she has been having spotting of blood. pt educated on plan of care, pt able to successfully teach back plan of care to RN, RN will continue to reeducate pt during hospital stay. Otezla Counseling: The side effects of Otezla were discussed with the patient, including but not limited to worsening or new depression, weight loss, diarrhea, nausea, upper respiratory tract infection, and headache. Patient instructed to call the office should any adverse effect occur.  The patient verbalized understanding of the proper use and possible adverse effects of Otezla.  All the patient's questions and concerns were addressed.

## 2020-11-17 NOTE — H&P ADULT - [facility]T D [facility]
Detail Level: Detailed Depth Of Biopsy: dermis Was A Bandage Applied: Yes Size Of Lesion In Cm: 0.8 X Size Of Lesion In Cm: 0 Biopsy Type: H and E Biopsy Method: Personna blade Anesthesia Type: 0.5% bupivacaine Anesthesia Volume In Cc (Will Not Render If 0): 0.5 Hemostasis: Naomi's Wound Care: Petrolatum Dressing: bandage Destruction After The Procedure: No Type Of Destruction Used: Curettage Curettage Text: The wound bed was treated with curettage after the biopsy was performed. Cryotherapy Text: The wound bed was treated with cryotherapy after the biopsy was performed. Electrodesiccation Text: The wound bed was treated with electrodesiccation after the biopsy was performed. Electrodesiccation And Curettage Text: The wound bed was treated with electrodesiccation and curettage after the biopsy was performed. Silver Nitrate Text: The wound bed was treated with silver nitrate after the biopsy was performed. Lab: 6 Lab Facility: 3 Consent: Written consent was obtained and risks were reviewed including but not limited to scarring, infection, bleeding, scabbing, incomplete removal, nerve damage and allergy to anesthesia. Post-Care Instructions: I reviewed with the patient in detail post-care instructions.TWICE A DAY –  Every morning and evening to allow for optimal healing:\\n1.  Wash hands with soap and water.\\n2. Cleanse the wound gently with soap and water.  This can be done in the shower. \\n3. Dry the area gently with a clean towel or gauze pad.\\n4. Apply a thin layer of petroleum jelly (Vaseline or Aquaphor) or \\nantibiotic ointment (Polysporin/Bacitracin) and cover with a bandage.\\n5. If sutures were placed, please have suture removed in _______days.\\n6. Repeat wound care twice daily until completely healed.\\Lulu the skin heals, the biopsy site will have a dark pink-dark red appearance, often a light yellowish/cobblestone like film over the biopsy site and the biopsy site edges will be red.  If you site becomes increasingly red, inflamed, swollen, unusually tender, has drainage, or you have any questions/concerns, please call the office. Notification Instructions: Patient will be notified of biopsy results. However, patient instructed to call the office if not contacted within 2 weeks. Billing Type: Third-Party Bill Information: Selecting Yes will display possible errors in your note based on the variables you have selected. This validation is only offered as a suggestion for you. PLEASE NOTE THAT THE VALIDATION TEXT WILL BE REMOVED WHEN YOU FINALIZE YOUR NOTE. IF YOU WANT TO FAX A PRELIMINARY NOTE YOU WILL NEED TO TOGGLE THIS TO 'NO' IF YOU DO NOT WANT IT IN YOUR FAXED NOTE. regular rate and rhythm

## 2021-03-30 NOTE — ED ADULT TRIAGE NOTE - CHIEF COMPLAINT QUOTE
"I have back pain" "I am pregnant and I am spotting a bit" c/o back pain and vaginal bleeding, reports being 18x weeks pregnant +prenatal care. Reports using 1x pad tonight. Denies fevers denies dizziness. Skin warm and dry. RR even and unlabored. Able to ambulate with steady gait noted, appears in no apparent distress @ this time [Well-appearing] : well-appearing [Alert] : alert [Well related, good eye contact] : well related, good eye contact [Conversant] : conversant [Normal speech and language] : normal speech and language [Pupils reactive to light and accommodation] : pupils reactive to light and accommodation [Full extraocular movements] : full extraocular movements [No nystagmus] : no nystagmus [Normal facial sensation to light touch] : normal facial sensation to light touch [No facial asymmetry or weakness] : no facial asymmetry or weakness [Gross hearing intact] : gross hearing intact [Equal palate elevation] : equal palate elevation [Good shoulder shrug] : good shoulder shrug [Normal tongue movement] : normal tongue movement [Normal axial and appendicular muscle tone] : normal axial and appendicular muscle tone [Gets up on table without difficulty] : gets up on table without difficulty [No pronator drift] : no pronator drift [Normal finger tapping and fine finger movements] : normal finger tapping and fine finger movements [No abnormal involuntary movements] : no abnormal involuntary movements [5/5 strength in proximal and distal muscles of arms and legs] : 5/5 strength in proximal and distal muscles of arms and legs [Walks and runs well] : walks and runs well [Able to do deep knee bend] : able to do deep knee bend [Able to walk on heels] : able to walk on heels [Able to walk on toes] : able to walk on toes [2+ biceps] : 2+ biceps [Knee jerks] : knee jerks [Ankle jerks] : ankle jerks [No ankle clonus] : no ankle clonus [Bilaterally] : bilaterally [Localizes LT and temperature] : localizes LT and temperature [No dysmetria on FTNT] : no dysmetria on FTNT [Good walking balance] : good walking balance [Normal gait] : normal gait [Able to tandem well] : able to tandem well [Negative Romberg] : negative Romberg [de-identified] : awake, alert, in NAD; loud throat clearing sounds heard initially and less so after that , eye blinking was also observed [de-identified] : throat clear [de-identified] : very outgoing; very friendly; asked to help me type on the computer

## 2021-09-14 NOTE — PATIENT PROFILE ADULT - PATIENT REPRESENTATIVE: ( YOU CAN CHOOSE ANY PERSON THAT CAN ASSIST YOU WITH YOUR HEALTH CARE PREFERENCES, DOES NOT HAVE TO BE A SPOUSE, IMMEDIATE FAMILY OR SIGNIFICANT OTHER/PARTNER)
[facility]  Certificate of Child Health Examination  Student's Name:   Kenya Rees Birth Date  2014  Sex  female  Race/Ethnicity   School/Grade Level/ID#     Address:   [address]  [facility] [address]  Parent/Guardian             Telephone#                                            Home:                               Work:   IMMUNIZATIONS: To be completed by health care provider. The mo/da/yr for every dose administered is required. If a specific vaccine is medically contraindicated, a separate written statement must be attached by the health care responsible for completing the health examination explaining the medical reason for the contraindication.      Immunization History   Administered Date(s) Administered   • DTaP(INFANRIX) 05/27/2015   • DTaP/HIB/IPV 2013, 2013, 01/09/2014   • DTaP/IPV 07/11/2017   • Hep A, Unspecified formulation 06/23/2014   • Hep A, ped/adol, 2 dose 11/21/2015   • Hep B, adult 2013, 2013, 01/09/2014   • Hib (PRP-T) 09/15/2014   • Influenza, injectable, quadrivalent 01/09/2014   • MMR 06/23/2014, 07/11/2017   • Pneumococcal Conjugate 13 valent 2013, 2013, 01/09/2014, 09/15/2014   • Rotavirus - monovalent 2013, 2013   • Varicella 06/23/2014, 07/11/2017      Immunizations given 9/14/2021: None      Health care provider (MD, DO, APN, PA, school health professional, health official) verifying above immunization history must sign below. If adding dates to the above immunization history section, put your initials by date(s) and sign here.)  Signature                                                                 Title                                                Date  _____________________________________________________________________________________  Signature                                                                 Title                                                 Date  _____________________________________________________________________________________   ALTERNATIVE PROOF OF IMMUNITY   1. Clinical diagnosis (measles, mumps, hepatitis B) is allowed when verified by physician and supported with lab confirmation.  Attach copy of lab result.    * MEASLES ([place])  MO   DA  YR          **MUMPS  MO   DA  YR             HEPATITIS B  MO   DA   YR           VARICELLA  MO   DA  YR      2. History of varicella (chickenpox) disease is acceptable if verified by health care provider, school health professional or health official.  Person signing below verifies that the parent/guardian’s description of varicella disease history is indicative of past infection and is accepting such history as documentation of disease.  Date of Disease                                  Signature                                                           Title   3. Laboratory Evidence of Immunity (check one)      __ Measles*         __ Mumps**        __ Rubella        __Varicella    (Attach copy of lab result)         *All measles cases diagnosed on or after July 1, 2002, must be confirmed by laboratory evidence.      **All mumps cases diagnosed on or after July 1, 2013, must be confirmed by laboratory evidence.     Completion of Alternative 1 or 3 MUST be accompanied by Labs & Physician Signature: ___________________________  Physician Statements of Immunity MUST be submitted to [facility] for review.     Certificates of Jehovah's witness Exemption to Immunizations or Physician Medical Statements of Medical Contraindication Are Reviewed   and Maintained by the School Authority     (11/2015)                                         (COMPLETE BOTH SIDES)                                  Approved [facility] 3/2016      Student's Name:  Kenya Rees Birth Date 2014 Sex female School Grade Level/ID#     Page 2 of 2   HEALTH HISTORY      TO BE COMPLETED AND SIGNED BY PARENT/GUARDIAN AND VERIFIED BY HEALTH CARE  PROVIDER  ALLERGIES: (Food, drug, insect, other) No has no allergies on file.   MEDICATION: (List all prescribed or taken on a regular basis.)   No   Diagnosis of asthma?  Child wakes during night coughing? Yes    No  Yes    No  Loss of function of one of paired  organs?(eye/ear/kidney/testicle) Yes    No    Birth defects? Yes    No  Hospitalizations? Yes    No    Developmental delay? Yes    No   When? What for? Yes    No    Blood disorders? Hemophilia,  Sickle Cell, Other? Explain. Yes    No  Surgery? (List all.)  When? What for? Yes    No    Diabetes? Yes    No  Serious injury or illness? Yes    No    Head injury/Concussion/Passed out? Yes    No  TB skin test positive (past/present)? * Yes    No *If yes, refer to local health dept   Seizures? What are they like?  Yes    No  TB disease (past or present)? * Yes    No *If yes, refer to local [facility] dept   Heart problem/Shortness of breath?  Yes    No  Tobacco use (type, frequency)?  Yes    No    Heart murmur/High blood pressure? Yes    No  Alcohol/Drug use?  Yes    No    Dizziness or chest pain with exercise? Yes    No  Family history of sudden death  before age 50? (Cause?) Yes    No    Eye/Vision problems? ______           __Glasses          __Contacts  Last exam by eye doctor ______  Other concerns? (crossed eye, drooping lids, squinting, difficulty reading) Dental?   __ Braces     __ Bridge     __ Plate   __Other   Ear/Hearing problems? Yes    No  Information may be shared with appropriate personnel for health and educational purposes.   Bone/Joint problem/injury/scoliosis? Yes    No  Parent/Guardian  Signature                                               Date   PHYSICAL EXAMINATION REQUIREMENTS   Entire section below to be completed by MD/DO/APN/PA Head Circumference if <2-3 years old - /69   Pulse 93   Temp 98.1 °F (36.7 °C) (Temporal)   Ht 3' 11.34\" (1.202 m)   Wt 26.3 kg (58 lb)   BMI 18.19 kg/m²   BSA 0.93 m²    83 %ile (Z= 0.94) based on CDC  (Girls, 2-20 Years) BMI-for-age based on BMI available as of 9/14/2021.   DIABETES SCREENING (NOT REQUIRED FOR ) BMI>85% age/sex No     And any two of the following: Family History: No  Ethnic Minority: Yes    Signs of Insulin Resistance (hypertension, dyslipidemia, polycystic ovarian syndrome, acanthosis nigricans): No  At Risk: No   LEAD RISK QUESTIONNAIRE Required for children age 6 months through 6 years enrolled in licensed or public school operated day care, , nursery school and/or . (Blood test required if resides in [place] or high risk zip code.)  Questionnaire Administered? Yes  Blood Test Indicated? No   Blood Test Date _____   Blood Test Result ______________   TB SKIN OR BLOOD TEST Recommended only for children in high-risk groups including children immunosuppressed due to HIV infection or other conditions, frequent travel to or born in high prevalence countries or those exposed to adults in high-risk categories. See CDC guidelines. http://www.cdc.gov/tb/publications/factsheets/testing/TB_testing.htm.  Test Needed: No     Test performed: No                          Skin Test:    Date Read              /      /             Result:   Positive__      Negative__        mm________                          Blood Test: Date Reported       /      /               Result:  Positive__      Negative__      Value________  LAB TESTS (recommended) Date/Result  Date/Results   Hemoglobin or Hematocrit NA Sickle Cell (when indicated) NA   Urinalysis NA Developmental Screening Tool NA        SYSTEM REVIEW Normal  Comments/Follow-up/Needs  Normal Comments/Follow-up/Needs   Skin  Yes  Endocrine Yes    Ears Yes                  Screening Result Gastrointestinal Yes    Eyes Yes                  Screening Result: Genito-Urinary Yes                                            LMP   Nose Yes  Neurologic Yes    Throat Yes  Musculoskeletal Yes    Mouth/Dental Yes  Spinal Exam Yes    Cardiovascular/HTN  Yes  Nutritional status Yes    Respiratory Yes Diagnosis of Asthma: No   Mental Health Yes    Currently Prescribed Asthma Medication:        No  Quick-relief medication (e.g. Short Acting Beta Antagonist)        No   Controller medication (e.g. inhaled corticosteroid) Other     NEEDS/MODIFICATIONS required in the school setting: No restrictions DIETARY Needs/Restrictions: No restrictions   SPECIAL INSTRUCTIONS/DEVICES e.g. safety glasses, glass eye, chest protector for arrhythmia, pacemaker, prosthetic device, dental bridge, false teeth, athletic support/cup: No restrictions   MENTAL HEALTH/OTHER Is there anything else the school should know about this student?  If you would like to discuss this student’s health with school or school health personnel:  Not needed   EMERGENCY ACTION needed while at school due to child’s health condition (e.g. ,seizures, asthma, insect sting, food, peanut allergy, bleeding problem, diabetes, heart problem)?   No   On the basis of the examination on this day, I approve this child’s participation in                                (If No or Modified please attach explanation.)  PHYSICAL EDUCATION:  Yes                               [employer]OR[employer]   Yes   Print Name  Dali Thakur MD         Signature                                                                       Date: 9/14/2021   Address:  [address] [address]  [phone]         (Complete Both Sides)     Approved [facility] 3/2016   declines

## 2021-11-17 NOTE — ED ADULT TRIAGE NOTE - HEIGHT IN INCHES
"Chief Complaint   Patient presents with   • Hypertension     Discuss the medication    • Other     Rectal bleeding x 3 days / and shoulder pain       HISTORY OF PRESENT ILLNESS: Patient is a 64 y.o. female established patient who presents today to discuss HTN, rectal bleeding    Rectal bleeding  This past Monday had diarrhea, later in the day noticed blood        ROS: per HPI    Exam:  /82 (BP Location: Left arm, Patient Position: Sitting, BP Cuff Size: Adult)   Pulse (!) 105   Temp 36.6 °C (97.9 °F) (Temporal)   Resp 16   Ht 1.549 m (5' 1\")   Wt 68.4 kg (150 lb 12.8 oz)   SpO2 95%   General:  Well nourished, well developed female in NAD  Skin: warm, dry, intact, no evidence of rash or concerning lesions  Head: is grossly normal.  HEENT: eyes clear, conjunctiva normal, PERRLA  Pulmonary: regular rate, rhythm and effort  Musculoskeletal: no clubbing, cyanosis, or edema.  Psych/mental: no depression, anxiety, hallucinations  Neuro: alert, intact, CN 2-12 grossly intact      Medical decision-making and discussion:        Assessment/Plan:      1. Primary hypertension  Encouraged patient to start blood pressure medication prescribed at her visit last week, this includes losartan 50 mg daily    2. Rectal bleeding  Patient will start over-the-counter Preparation H or Anusol suppositories  Discussed prevention of constipation, hemorrhoids  She is to have close follow-up in about 6 to 8 weeks    3. Numbness and tingling in right hand  We will get cervical spine x-ray, she will be notified of results and further actions if needed  - DX-CERVICAL SPINE-2 OR 3 VIEWS; Future         Return in about 2 months (around 1/17/2022) for Follow-up.        Please note that this dictation was created using voice recognition software. I have made every reasonable attempt to correct obvious errors, but I expect that there are errors of grammar and possibly content that I did not discover before finalizing the note.    " 4

## 2021-11-30 NOTE — ED ADULT NURSE NOTE - IN THE PAST YEAR, HOW OFTEN HAVE YOU USED TOBACCO PRODUCTS?
Never Minoxidil Counseling: Minoxidil is a topical medication which can increase blood flow where it is applied. It is uncertain how this medication increases hair growth. Side effects are uncommon and include stinging and allergic reactions.

## 2022-02-02 NOTE — DISCHARGE NOTE ADULT - NS AS DC PROVIDER CONTACT Y/N MULTI
Detail Level: Zone
Initiate Treatment: -\\n- Ketoconazole shampoo: Use as scalp wash QD until improved, then 2-3x weekly for maintenance. Lather and let it sit for 3-5 minutes prior to rinsing.\\n- Triamcinolone 0.025% ointment: Apply to scaling areas (forehead) BID for up to two weeks on, one week off. Repeat as needed for flares.
Render In Strict Bullet Format?: Yes
Yes

## 2022-03-06 NOTE — ED PROVIDER NOTE - RELIEVING FACTORS
none
You can access the FollowMyHealth Patient Portal offered by [facility] by registering at the following website: [email or web address]. By joining [facility]’s FollowMyHealth portal, you will also be able to view your health information using other applications (apps) compatible with our system.

## 2022-04-21 NOTE — PATIENT PROFILE ADULT - INFORMATION PROVIDED TO:
"Frozen Shoulder (ADHESIVE CAPSULITIS)    PT recommended  Medrol pack prescribed  Consider steroid injection  Follow up in 6 weeks    Frozen shoulder, also called adhesive capsulitis, causes pain and stiffness in the shoulder. Over time, the shoulder becomes very hard to move.    After a period of worsening symptoms, frozen shoulder tends to get better, although full recovery may take up to 3 years. Physical therapy, with a focus on shoulder flexibility, is the primary treatment recommendation for frozen shoulder.    Frozen shoulder most commonly affects people between the ages of 40 and 60, and occurs in women more often than men. In addition, people with diabetes are at an increased risk for developing frozen shoulder.     Anatomy  Your shoulder is a ball-and-socket joint made up of three bones: your upper arm bone (humerus), your shoulder blade (scapula), and your collarbone (clavicle).    The head of the upper arm bone fits into a shallow socket in your shoulder blade. Strong connective tissue, called the shoulder capsule, surrounds the joint.    To help your shoulder move more easily, synovial fluid lubricates the shoulder capsule and the joint.        The shoulder capsule surrounds the shoulder joint and rotator cuff tendons.  Reproduced and modified from The Body Almanac. (c) American Academy of Orthopaedic Surgeons, 2003.    Description  In frozen shoulder, the shoulder capsule thickens and becomes stiff and tight. Thick bands of tissue -- called adhesions -- develop. In many cases, there is less synovial fluid in the joint.    The hallmark signs of this condition are severe pain and being unable to move your shoulder -- either on your own or with the help of someone else. It develops in three stages:    Stage 1: Freezing  In the \"freezing\" stage, you slowly have more and more pain. As the pain worsens, your shoulder loses range of motion. Freezing typically lasts from 6 weeks to 9 months.    Stage 2: " "Frozen  Painful symptoms may actually improve during this stage, but the stiffness remains. During the 4 to 6 months of the \"frozen\" stage, daily activities may be very difficult.    Stage 3: Thawing  Shoulder motion slowly improves during the \"thawing\" stage. Complete return to normal or close to normal strength and motion typically takes from 6 months to 2 years.    Inflamed joint capsule  In frozen shoulder, the smooth tissues of the shoulder capsule become thick, stiff, and inflamed.    Cause  The causes of frozen shoulder are not fully understood. There is no clear connection to arm dominance or occupation. A few factors may put you more at risk for developing frozen shoulder.    Diabetes. Frozen shoulder occurs much more often in people with diabetes. The reason for this is not known. In addition, diabetic patients with frozen shoulder tend to have a greater degree of stiffness that continues for a longer time before \"thawing.\"    Other diseases. Some additional medical problems associated with frozen shoulder include hypothyroidism, hyperthyroidism, Parkinson's disease, and cardiac disease.    Immobilization. Frozen shoulder can develop after a shoulder has been immobilized for a period of time due to surgery, a fracture, or other injury. Having patients move their shoulders soon after injury or surgery is one measure prescribed to prevent frozen shoulder.    Symptoms  Pain from frozen shoulder is usually dull or aching. It is typically worse early in the course of the disease and when you move your arm. The pain is usually located over the outer shoulder area and sometimes the upper arm.    What to do if you have frozen shoulder    Treatment is focused on relieving pain and restoring your shoulder's normal range of motion. Your doctor may suggest that you take a nonsteroidal anti-inflammatory medication such as aspirin, ibuprofen (Advil, Motrin, generic), or naproxen (Aleve, Naprosyn, generic) to quiet " "inflammation and ease pain. Sometimes an injection of a corticosteroid into the shoulder joint or nearby soft tissues may be needed.    But the cornerstone of treatment is physical therapy, concentrating on exercises that stretch the joint capsule (see Stretching exercises for a frozen shoulder). Muscle strengthening comes later.    A physical therapist can show you how far to push yourself and teach you the appropriate exercises. Once you've learned your limitations, you can do most of the stretching routine on your own at home.    While you're working to stretch the shoulder capsule, try to avoid any sports or daily activities that increase inflammation or aggravate your pain. If you diligently follow your shoulder-stretching regimen, you'll probably be able to return to your usual level of activity. Full recovery may take several months. If you don't see steady improvement or you reach a plateau, go back to your clinician or consult a shoulder expert.    A small percentage of people need surgery to \"unfreeze\" the shoulder.    Frozen shoulder (also known as adhesive capsulitis) is a condition in which the shoulder is stiff, painful, and has limited motion in all directions    Stretching exercises are usually the cornerstone of treating frozen shoulder.    Always warm up your shoulder before performing your exercises. The best way to do that is to take a warm shower or bath for 10 to 15 minutes. You can also use a moist heating pad or damp towel heated in the microwave, but it may not be as effective.    In performing the following exercises, stretch to the point of tension but not pain.        1. Pendulum stretch    Do this exercise first. Relax your shoulders. Stand and lean over slightly, allowing the affected arm to hang down. Swing the arm in a small Belkofski -- about a foot in diameter. Perform 10 revolutions in each direction, once a day. As your symptoms improve, increase the diameter of your swing, but never " force it. When you're ready for more, increase the stretch by holding a light weight (three to five pounds) in the swinging arm.    2. Towel stretch    Hold one end of a three-foot-long towel behind your back and grab the opposite end with your other hand. Hold the towel in a horizontal position. Use your good arm to pull the affected arm upward to stretch it. You can also do an advanced version of this exercise with the towel draped over your good shoulder. Hold the bottom of the towel with the affected arm and pull it toward the lower back with the unaffected arm. Do this 10 to 20 times a day.        3. Finger walk    Face a wall three-quarters of an arm's length away. Reach out and touch the wall at waist level with the fingertips of the affected arm. With your elbow slightly bent, slowly walk your fingers up the wall, spider-like, until you've raised your arm as far as you comfortably can. Your fingers should be doing the work, not your shoulder muscles. Slowly lower the arm (with the help of the good arm, if necessary) and repeat. Perform this exercise 10 to 20 times a day.        4. Cross-body reach    Sit or stand. Use your good arm to lift your affected arm at the elbow, and bring it up and across your body, exerting gentle pressure to stretch the shoulder. Hold the stretch for 15 to 20 seconds. Do this 10 to 20 times per day.        5. Armpit stretch    Using your good arm, lift the affected arm onto a shelf about breast-high. Gently bend your knees, opening up the armpit. Deepen your knee bend slightly, gently stretching the armpit, and then straighten. With each knee bend, stretch a little further, but don't force it. Do this 10 to 20 times each day.    Starting to strengthen    As your range of motion improves, add rotator cuff-strengthening exercises. Be sure to warm up your shoulder and do your stretching exercises before you perform strengthening exercises.        6. Outward rotation    Hold a rubber  exercise band between your hands with your elbows at a 90-degree angle close to your sides. Rotate the lower part of the affected arm outward two or three inches and hold for five seconds. Repeat 10 to 15 times, once a day.      7. Inward rotation    Stand next to a closed door, and hook one end of a rubber exercise band around the doorknob. Hold the other end with the hand of the affected arm, holding your elbow at a 90-degree angle. Pull the band toward your body two or three inches and hold for five seconds. Repeat 10 to 15 times, once a day.        For more exercises to improve your balance and prevent falls, increase your flexibility, and even help relieve arthritis, back, and knee pain, buy the [place] [facility] Report Stretchin exercises to improve flexibility and reduce pain.    Reference: https://www.health.[place].edu/shoulders/stretching-exercises-frozen-shoulder     patient

## 2022-06-20 NOTE — ED PROVIDER NOTE - NS HIV RISK FACTOR YES
Bessie Villalobos was admitted to [facility] from ED via bed accompanied by daughter.   Reason for hospitalization is TIA observation.   Upon arrival, patient is stable. 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), Asymptomatic postmenopausal status (age-related) (natural), Blood transfusion, Cataract, Cerebral infarction (CMS/HCC) (2011), Degeneration of intervertebral disc, site unspecified (03/19/2008), Diverticulitis, DIZZINESS, Epilepsy complicating pregnancy, childbirth, or the puerperium, unspecified as to episode of care or not applicable(649.40), Essential hypertension, benign, Fracture, GERD, Hemorrhage of gastrointestinal tract, unspecified, Intraventricular hemorrhage (CMS/HCC) (11/2014), Osteoarthritis gen'l, Other and unspecified hyperlipidemia, Pacemaker at end of battery life (1/4/2017), POLYP COLON, Urinary retention (12/19/2014), and Urinary tract infection..  Patient oriented to bed, call light and room.  Patient provided with the following educational materials upon admission:safety, advanced directives, infection control and pain.   Level of understanding patient verbalized understanding and significant other or family member verbalized understanding.   Admission orders received at this time.   MD notified of patient arrival.   See Epic documentation for patient individualized nursing care plan.   Declined

## 2023-03-10 NOTE — PATIENT PROFILE ADULT - NSPROPTRIGHTBILLOFRIGHTS_GEN_A_NUR
patient Erythromycin Pregnancy And Lactation Text: This medication is Pregnancy Category B and is considered safe during pregnancy. It is also excreted in breast milk.

## 2023-05-24 NOTE — ED PROVIDER NOTE - NS ED MD DISPO SPECIAL CONSIDERATION1
Problem: Adult Inpatient Plan of Care  Goal: Plan of Care Review  Outcome: Adequate for Care Transition  Goal: Patient-Specific Goal (Individualized)  Outcome: Adequate for Care Transition  Goal: Absence of Hospital-Acquired Illness or Injury  Outcome: Adequate for Care Transition  Goal: Optimal Comfort and Wellbeing  Outcome: Adequate for Care Transition  Goal: Readiness for Transition of Care  Outcome: Adequate for Care Transition     Problem: Diabetes Comorbidity  Goal: Blood Glucose Level Within Targeted Range  Outcome: Adequate for Care Transition     Pt aaox4, awaiting discharge. NSR, afebrile, on RA. VSS. Discharge teaching and paperwork done with patient. Pt reports no chest pain at this time. Pt able to eat meals. Pt voided good UOP per flowsheets. Diabetic teaching performed at the bedside. Meds delivered per pharmacy. Cardiac and spo2 monitoring discontinued. PIVs removed. Call light within reach. Cell phone at bedside. Pt to leave via car ride with family member/friend. All belongings returned to patient.   None

## 2023-09-14 NOTE — DIETITIAN INITIAL EVALUATION ADULT. - ADD RECOMMEND
Quality 226: Preventive Care And Screening: Tobacco Use: Screening And Cessation Intervention: Patient screened for tobacco use and is an ex/non-smoker Quality 111:Pneumonia Vaccination Status For Older Adults: Patient received any pneumococcal conjugate or polysaccharide vaccine on or after their 60th birthday and before the end of the measurement period Detail Level: Detailed Quality 110: Preventive Care And Screening: Influenza Immunization: Influenza Immunization Administered during Influenza season RX: MVI daily

## 2023-10-05 NOTE — ED ADULT TRIAGE NOTE - SPO2 (%)
[facility] Care Coordination Contact    Received fax from [facility]. Letter to inform member her MA was automatically renewed, effective Nov 2023.  Lizzie Wilson RN  [facility]   [phone]    
[phone]

## 2025-03-27 NOTE — PROVIDER CONTACT NOTE (EICU) - SITUATION
The following PPE was donned during all care and treatment procedures for the patient:    Protective Eyewear (__)    Surgical Face Mask (_x_)    N95 Mask (_x_)    Gloves (_x_)    Protective Gown (__)   electrolyte results

## 2025-07-21 NOTE — ED ADULT NURSE NOTE - PERIPHERAL VASCULAR
Patient LVM, she believes she has a UTI. Please call to discuss.  
Patient states that she has been experiencing increased frequency and urgency and increased incontinence/leakage for 4 days. Denies fever, chills or flank pain.  Orders placed for UA and urine culture.  Patient verbalized understanding. Instructed that we will call her with results.  
WDL